# Patient Record
Sex: FEMALE | Race: WHITE | NOT HISPANIC OR LATINO | Employment: UNEMPLOYED | URBAN - METROPOLITAN AREA
[De-identification: names, ages, dates, MRNs, and addresses within clinical notes are randomized per-mention and may not be internally consistent; named-entity substitution may affect disease eponyms.]

---

## 2017-03-03 ENCOUNTER — OFFICE VISIT (OUTPATIENT)
Dept: PEDIATRICS | Facility: CLINIC | Age: 57
End: 2017-03-03
Payer: COMMERCIAL

## 2017-03-03 VITALS
TEMPERATURE: 99.4 F | SYSTOLIC BLOOD PRESSURE: 138 MMHG | HEIGHT: 64 IN | HEART RATE: 90 BPM | BODY MASS INDEX: 41.81 KG/M2 | OXYGEN SATURATION: 95 % | DIASTOLIC BLOOD PRESSURE: 80 MMHG | WEIGHT: 244.9 LBS

## 2017-03-03 DIAGNOSIS — E28.2 POLYCYSTIC OVARIAN SYNDROME: ICD-10-CM

## 2017-03-03 DIAGNOSIS — J01.90 ACUTE NON-RECURRENT SINUSITIS, UNSPECIFIED LOCATION: ICD-10-CM

## 2017-03-03 DIAGNOSIS — E66.01 MORBID OBESITY DUE TO EXCESS CALORIES (H): Primary | ICD-10-CM

## 2017-03-03 LAB — HBA1C MFR BLD: 5.5 % (ref 4.3–6)

## 2017-03-03 PROCEDURE — 36415 COLL VENOUS BLD VENIPUNCTURE: CPT | Performed by: INTERNAL MEDICINE

## 2017-03-03 PROCEDURE — 99214 OFFICE O/P EST MOD 30 MIN: CPT | Performed by: INTERNAL MEDICINE

## 2017-03-03 PROCEDURE — 83036 HEMOGLOBIN GLYCOSYLATED A1C: CPT | Performed by: INTERNAL MEDICINE

## 2017-03-03 RX ORDER — METFORMIN HCL 500 MG
1000 TABLET, EXTENDED RELEASE 24 HR ORAL 2 TIMES DAILY WITH MEALS
Qty: 360 TABLET | Refills: 3 | Status: SHIPPED | OUTPATIENT
Start: 2017-03-03 | End: 2018-03-04

## 2017-03-03 NOTE — PROGRESS NOTES
"  SUBJECTIVE:                                                    Vani Daniel is a 56 year old female who presents to clinic today for the following health issues:      RESPIRATORY SYMPTOMS      Duration: about 1 month     Description: started as a cold and then down to chest with hard coughing.  Was losing voice.  Now only on one side: nasal congestion, facial pain/pressure, cough from drainage, ear pain right and hoarse voice    Severity: moderate    Accompanying signs and symptoms: diarrhea, chills    History (predisposing factors):  none    Precipitating or alleviating factors: None    Therapies tried and outcome:  None currently, only cough syrup at night when cough was severe      Working as , getting 8-10k steps/d.  Taking metformin regularly.      Problem list and histories reviewed & adjusted, as indicated.  Additional history: as documented    Labs reviewed in EPIC    Reviewed and updated as needed this visit by clinical staff  Tobacco  Allergies  Meds  Problems  Med Hx  Surg Hx  Fam Hx  Soc Hx        Reviewed and updated as needed this visit by Provider  Allergies  Meds  Problems         ROS:  Constitutional, HEENT, cardiovascular, pulmonary, gi and gu systems are negative, except as otherwise noted.    OBJECTIVE:                                                    /80 (BP Location: Right arm, Patient Position: Chair, Cuff Size: Adult Large)  Pulse 90  Temp 99.4  F (37.4  C) (Tympanic)  Ht 5' 3.5\" (1.613 m)  Wt 244 lb 14.4 oz (111.1 kg)  LMP 01/25/2009  SpO2 95%  BMI 42.7 kg/m2  Body mass index is 42.7 kg/(m^2).  GENERAL: healthy, alert and no distress  EYES: Eyes grossly normal to inspection, PERRL and conjunctivae and sclerae normal  HENT: ear canals and TM's normal, nose and mouth without ulcers or lesions  NECK: no adenopathy, no asymmetry, masses, or scars and thyroid normal to palpation  RESP: lungs clear to auscultation - no rales, rhonchi or wheezes  CV: " regular rate and rhythm, normal S1 S2, no S3 or S4, no murmur, click or rub, no peripheral edema and peripheral pulses strong  MS: no gross musculoskeletal defects noted, no edema         ASSESSMENT/PLAN:                                                        1. Morbid obesity due to excess calories (H)  Reviewed the treatment options for obesity including diet and exercise regimens.  Emphasized that lifestyle change, and most particularly regular exercise.  Continue metformin  - Hemoglobin A1c    2. Polycystic ovarian syndrome  Stable, recheck labs  - Hemoglobin A1c  - metFORMIN (GLUCOPHAGE-XR) 500 MG 24 hr tablet; Take 2 tablets (1,000 mg) by mouth 2 times daily (with meals)  Dispense: 360 tablet; Refill: 3    3. Acute non-recurrent sinusitis, unspecified location  Symptomatic cares, antibiotic per orders and follow-up if not improving  - amoxicillin-clavulanate (AUGMENTIN) 875-125 MG per tablet; Take 1 tablet by mouth 2 times daily  Dispense: 20 tablet; Refill: 0    See Patient Instructions    Bianca Arias MD  [facility]    The 10-year ASCVD risk score (Chelsea SHERWIN Jr., et al., 2013) is: 3.3%    Values used to calculate the score:      Age: 56 years      Sex: Female      Is Non- : No      Diabetic: No      Tobacco smoker: No      Systolic Blood Pressure: 138 mmHg      Is Prescribed Antihypertensives: No      HDL Cholesterol: 49 mg/dL      Total Cholesterol: 194 mg/dL

## 2017-03-03 NOTE — PATIENT INSTRUCTIONS
Sinusitis (Antibiotic Treatment)    The sinuses are air-filled spaces within the bones of the face. They connect to the inside of the nose. Sinusitis is an inflammation of the tissue lining the sinus cavity. Sinus inflammation can occur during a cold. It can also be due to allergies to pollens and other particles in the air. Sinusitis can cause symptoms of sinus congestion and fullness. A sinus infection causes fever, headache and facial pain. There is often green or yellow drainage from the nose or into the back of the throat (post-nasal drip). You have been given antibiotics to treat this condition.  Home care:    Take the full course of antibiotics as instructed. Do not stop taking them, even if you feel better.  Take 1 pill twice daily with food.  Take a probiotic with 10-30 billion colony forming units until off the antibiotic.    Drink plenty of water, hot tea, and other liquids. This may help thin mucus. It also may promote sinus drainage.    Heat may help soothe painful areas of the face. Use a towel soaked in hot water. Or,  the shower and direct the hot spray onto your face. Using a vaporizer along with a menthol rub at night may also help.     An expectorant containing guaifenesin may help thin the mucus and promote drainage from the sinuses.    Over-the-counter decongestants may be used unless a similar medicine was prescribed. Nasal sprays work the fastest. Use one that contains phenylephrine or oxymetazoline. First blow the nose gently. Then use the spray. Do not use these medicines more often than directed on the label or symptoms may get worse. You may also use tablets containing pseudoephedrine. Avoid products that combine ingredients, because side effects may be increased. Read labels. You can also ask the pharmacist for help.     Use acetaminophen or ibuprofen to control pain, unless another pain medicine was prescribed.     Don't smoke. This can worsen symptoms.  Follow-up care  Follow  up with your healthcare provider or our staff if you are not improving within the next week.  When to seek medical advice  Call your healthcare provider if any of these occur:    Facial pain or headache becoming more severe    Stiff neck    Unusual drowsiness or confusion    Swelling of the forehead or eyelids    Vision problems, including blurred or double vision    Fever of 100.4 F (38 C) or higher, or as directed by your healthcare provider    Seizure    Breathing problems    Symptoms not resolving within 10 days    3686-6967 [employer]. [address] [place], [place] [address]. All rights reserved. This information is not intended as a substitute for professional medical care. Always follow your healthcare professional's instructions.

## 2017-03-03 NOTE — NURSING NOTE
"Chief Complaint   Patient presents with     URI       Initial /80 (BP Location: Right arm, Patient Position: Chair, Cuff Size: Adult Large)  Pulse 90  Temp 99.4  F (37.4  C) (Tympanic)  Ht 5' 3.5\" (1.613 m)  Wt 244 lb 14.4 oz (111.1 kg)  LMP 01/25/2009  SpO2 95%  BMI 42.7 kg/m2 Estimated body mass index is 42.7 kg/(m^2) as calculated from the following:    Height as of this encounter: 5' 3.5\" (1.613 m).    Weight as of this encounter: 244 lb 14.4 oz (111.1 kg).  Medication Reconciliation: complete  "

## 2017-03-03 NOTE — MR AVS SNAPSHOT
After Visit Summary   3/3/2017    Vani Daniel    MRN: [id number]           Patient Information     Date Of Birth          1960        Visit Information        Provider Department      3/3/2017 3:50 PM Bianca Arias MD [facility]        Today's Diagnoses     Morbid obesity due to excess calories (H)    -  1    Polycystic ovarian syndrome        Acute non-recurrent sinusitis, unspecified location          Care Instructions      Sinusitis (Antibiotic Treatment)    The sinuses are air-filled spaces within the bones of the face. They connect to the inside of the nose. Sinusitis is an inflammation of the tissue lining the sinus cavity. Sinus inflammation can occur during a cold. It can also be due to allergies to pollens and other particles in the air. Sinusitis can cause symptoms of sinus congestion and fullness. A sinus infection causes fever, headache and facial pain. There is often green or yellow drainage from the nose or into the back of the throat (post-nasal drip). You have been given antibiotics to treat this condition.  Home care:    Take the full course of antibiotics as instructed. Do not stop taking them, even if you feel better.  Take 1 pill twice daily with food.  Take a probiotic with 10-30 billion colony forming units until off the antibiotic.    Drink plenty of water, hot tea, and other liquids. This may help thin mucus. It also may promote sinus drainage.    Heat may help soothe painful areas of the face. Use a towel soaked in hot water. Or,  the shower and direct the hot spray onto your face. Using a vaporizer along with a menthol rub at night may also help.     An expectorant containing guaifenesin may help thin the mucus and promote drainage from the sinuses.    Over-the-counter decongestants may be used unless a similar medicine was prescribed. Nasal sprays work the fastest. Use one that contains phenylephrine or oxymetazoline. First blow the nose  gently. Then use the spray. Do not use these medicines more often than directed on the label or symptoms may get worse. You may also use tablets containing pseudoephedrine. Avoid products that combine ingredients, because side effects may be increased. Read labels. You can also ask the pharmacist for help.     Use acetaminophen or ibuprofen to control pain, unless another pain medicine was prescribed.     Don't smoke. This can worsen symptoms.  Follow-up care  Follow up with your healthcare provider or our staff if you are not improving within the next week.  When to seek medical advice  Call your healthcare provider if any of these occur:    Facial pain or headache becoming more severe    Stiff neck    Unusual drowsiness or confusion    Swelling of the forehead or eyelids    Vision problems, including blurred or double vision    Fever of 100.4 F (38 C) or higher, or as directed by your healthcare provider    Seizure    Breathing problems    Symptoms not resolving within 10 days    1329-4531 [employer]. [address] [place] [address]. All rights reserved. This information is not intended as a substitute for professional medical care. Always follow your healthcare professional's instructions.              Follow-ups after your visit        Who to contact     If you have questions or need follow up information about today's clinic visit or your schedule please contact [facility] directly at [phone].  Normal or non-critical lab and imaging results will be communicated to you by MyChart, letter or phone within 4 business days after the clinic has received the results. If you do not hear from us within 7 days, please contact the clinic through [employer] or phone. If you have a critical or abnormal lab result, we will notify you by phone as soon as possible.  Submit refill requests through [employer] or call your pharmacy and they will forward the refill request to us. Please allow 3  "business days for your refill to be completed.          Additional Information About Your Visit        The [employer] Information     [employer] gives you secure access to your electronic health record. If you see a primary care provider, you can also send messages to your care team and make appointments. If you have questions, please call your primary care clinic.  If you do not have a primary care provider, please call [phone] and they will assist you.        Care EveryWhere ID     This is your Care EveryWhere ID. This could be used by other organizations to access your [place] medical records  [id number]        Your Vitals Were     Pulse Temperature Height Last Period Pulse Oximetry BMI (Body Mass Index)    90 99.4  F (37.4  C) (Tympanic) 5' 3.5\" (1.613 m) 01/25/2009 95% 42.7 kg/m2       Blood Pressure from Last 3 Encounters:   03/03/17 138/80   02/23/16 136/84   02/13/15 136/90    Weight from Last 3 Encounters:   03/03/17 244 lb 14.4 oz (111.1 kg)   04/05/16 256 lb 3.2 oz (116.2 kg)   02/23/16 253 lb (114.8 kg)              We Performed the Following     Hemoglobin A1c          Today's Medication Changes          These changes are accurate as of: 3/3/17  4:29 PM.  If you have any questions, ask your nurse or doctor.               Start taking these medicines.        Dose/Directions    amoxicillin-clavulanate 875-125 MG per tablet   Commonly known as:  AUGMENTIN   Used for:  Acute non-recurrent sinusitis, unspecified location   Started by:  Bianca Arias MD        Dose:  1 tablet   Take 1 tablet by mouth 2 times daily   Quantity:  20 tablet   Refills:  0         These medicines have changed or have updated prescriptions.        Dose/Directions    metFORMIN 500 MG 24 hr tablet   Commonly known as:  GLUCOPHAGE-XR   This may have changed:  additional instructions   Used for:  Polycystic ovarian syndrome   Changed by:  Bianca Arias MD        Dose:  1000 mg   Take 2 tablets (1,000 mg) by mouth 2 times daily (with " meals)   Quantity:  360 tablet   Refills:  3            Where to get your medicines      These medications were sent to [facility]/pharmacy #[id number] - [place], [place] - [address] AT [address] [place] [address], [place] [address]     Phone:  [phone]     amoxicillin-clavulanate 875-125 MG per tablet    metFORMIN 500 MG 24 hr tablet                Primary Care Provider Office Phone # Fax #    Bianca Arias [phone]       [address] [place] [address]        Thank you!     Thank you for choosing [facility]  for your care. Our goal is always to provide you with excellent care. Hearing back from our patients is one way we can continue to improve our services. Please take a few minutes to complete the written survey that you may receive in the mail after your visit with us. Thank you!             Your Updated Medication List - Protect others around you: Learn how to safely use, store and throw away your medicines at www.disposemymeds.org.          This list is accurate as of: 3/3/17  4:29 PM.  Always use your most recent med list.                   Brand Name Dispense Instructions for use    amoxicillin-clavulanate 875-125 MG per tablet    AUGMENTIN    20 tablet    Take 1 tablet by mouth 2 times daily       fish oil-omega-3 fatty acids 1000 MG capsule     180 Cap    take 2 Caps by mouth daily.       metFORMIN 500 MG 24 hr tablet    GLUCOPHAGE-XR    360 tablet    Take 2 tablets (1,000 mg) by mouth 2 times daily (with meals)       VITAMIN D-3 PO      Take  by mouth.

## 2017-11-03 ENCOUNTER — ALLIED HEALTH/NURSE VISIT (OUTPATIENT)
Dept: NURSING | Facility: CLINIC | Age: 57
End: 2017-11-03
Payer: COMMERCIAL

## 2017-11-03 DIAGNOSIS — Z23 NEED FOR PROPHYLACTIC VACCINATION AND INOCULATION AGAINST INFLUENZA: Primary | ICD-10-CM

## 2017-11-03 PROCEDURE — 90471 IMMUNIZATION ADMIN: CPT

## 2017-11-03 PROCEDURE — 99207 ZZC NO CHARGE NURSE ONLY: CPT

## 2017-11-03 PROCEDURE — 90686 IIV4 VACC NO PRSV 0.5 ML IM: CPT

## 2017-11-03 NOTE — MR AVS SNAPSHOT
After Visit Summary   11/3/2017    Vani Daniel    MRN: [id number]           Patient Information     Date Of Birth          1960        Visit Information        Provider Department      11/3/2017 11:30 AM SHANTE NURSE AB [facility] [place]        Today's Diagnoses     Need for prophylactic vaccination and inoculation against influenza    -  1       Follow-ups after your visit        Who to contact     If you have questions or need follow up information about today's clinic visit or your schedule please contact [facility] directly at [phone].  Normal or non-critical lab and imaging results will be communicated to you by [employer], letter or phone within 4 business days after the clinic has received the results. If you do not hear from us within 7 days, please contact the clinic through [employer] or phone. If you have a critical or abnormal lab result, we will notify you by phone as soon as possible.  Submit refill requests through [employer] or call your pharmacy and they will forward the refill request to us. Please allow 3 business days for your refill to be completed.          Additional Information About Your Visit        MyChart Information     [employer] gives you secure access to your electronic health record. If you see a primary care provider, you can also send messages to your care team and make appointments. If you have questions, please call your primary care clinic.  If you do not have a primary care provider, please call [phone] and they will assist you.        Care EveryWhere ID     This is your Care EveryWhere ID. This could be used by other organizations to access your [place] medical records  [id number]        Your Vitals Were     Last Period                   01/25/2009            Blood Pressure from Last 3 Encounters:   03/03/17 138/80   02/23/16 136/84   02/13/15 136/90    Weight from Last 3 Encounters:   03/03/17 244 lb 14.4 oz (111.1 kg)   04/05/16  256 lb 3.2 oz (116.2 kg)   02/23/16 253 lb (114.8 kg)              We Performed the Following     FLU VAC, SPLIT VIRUS IM > 3 YO (QUADRIVALENT) [75403]     Vaccine Administration, Initial [98473]        Primary Care Provider Office Phone # Fax #    Bianca Arias [phone] [address] [place] [address]        Equal Access to Services     [facility]: Hadii aad ku hadasho Soomaali, waaxda luqadaha, qaybta kaalmada adeegyada, waxay idiin hayaan adeeg apkathyashlee stafford . So [facility] [phone].    ATENCIÓN: Si rosamaria farmer, tiene a jones disposición servicios gratuitos de asistencia lingüística. Birdame al [phone].    We comply with applicable federal civil rights laws and [place] laws. We do not discriminate on the basis of race, color, national origin, age, disability, sex, sexual orientation, or gender identity.            Thank you!     Thank you for choosing [facility]  for your care. Our goal is always to provide you with excellent care. Hearing back from our patients is one way we can continue to improve our services. Please take a few minutes to complete the written survey that you may receive in the mail after your visit with us. Thank you!             Your Updated Medication List - Protect others around you: Learn how to safely use, store and throw away your medicines at www.disposemymeds.org.          This list is accurate as of: 11/3/17  1:23 PM.  Always use your most recent med list.                   Brand Name Dispense Instructions for use Diagnosis    amoxicillin-clavulanate 875-125 MG per tablet    AUGMENTIN    20 tablet    Take 1 tablet by mouth 2 times daily    Acute non-recurrent sinusitis, unspecified location       fish oil-omega-3 fatty acids 1000 MG capsule     180 Cap    take 2 Caps by mouth daily.        metFORMIN 500 MG 24 hr tablet    GLUCOPHAGE-XR    360 tablet    Take 2 tablets (1,000 mg) by mouth 2 times daily (with meals)    Polycystic  ovarian syndrome       VITAMIN D-3 PO      Take  by mouth.

## 2017-11-03 NOTE — PROGRESS NOTES
Injectable Influenza Immunization Documentation    1.  Is the person to be vaccinated sick today?   No    2. Does the person to be vaccinated have an allergy to a component   of the vaccine?   No  Egg Allergy Algorithm Link    3. Has the person to be vaccinated ever had a serious reaction   to influenza vaccine in the past?   No    4. Has the person to be vaccinated ever had Guillain-Barré syndrome?   No    Form completed by Rita Harris MA// November 3, 2017 1:22 PM

## 2018-04-25 ENCOUNTER — TRANSFERRED RECORDS (OUTPATIENT)
Dept: HEALTH INFORMATION MANAGEMENT | Facility: CLINIC | Age: 58
End: 2018-04-25

## 2018-05-23 ENCOUNTER — TRANSFERRED RECORDS (OUTPATIENT)
Dept: MULTI SPECIALTY CLINIC | Facility: CLINIC | Age: 58
End: 2018-05-23

## 2018-05-23 LAB — PAP SMEAR - HIM PATIENT REPORTED: NEGATIVE

## 2018-07-31 LAB
ALT SERPL-CCNC: 31 U/L
AST SERPL-CCNC: 22 U/L (ref 17–42)
CHOLEST SERPL-MCNC: 221 MG/DL (ref 100–200)
CREAT SERPL-MCNC: 0.9 MG/DL (ref 0.5–1.5)
GFR SERPL CREATININE-BSD FRML MDRD: 68.1 ML/MIN (ref 60–200)
GLUCOSE SERPL-MCNC: 100 MG/DL (ref 70–99)
HDLC SERPL-MCNC: 53 MG/DL (ref 40–150)
LDLC SERPL CALC-MCNC: 124 MG/DL (ref 0–100)
POTASSIUM SERPL-SCNC: 4.5 MEQ/L (ref 3.5–5.3)
TRIGL SERPL-MCNC: 219 MG/DL (ref 0–150)
TSH SERPL-ACNC: 1.97 MIU/ML (ref 0.27–4.2)

## 2019-02-01 ENCOUNTER — TRANSFERRED RECORDS (OUTPATIENT)
Dept: HEALTH INFORMATION MANAGEMENT | Facility: CLINIC | Age: 59
End: 2019-02-01

## 2019-02-01 LAB
ALT SERPL-CCNC: 31 U/L (ref 0–65)
AST SERPL-CCNC: 22 U/L (ref 17–42)
CHOLEST SERPL-MCNC: 185 MG/DL (ref 100–200)
CREAT SERPL-MCNC: 0.8 MG/DL (ref 0.5–1.5)
GFR SERPL CREATININE-BSD FRML MDRD: 74.2 ML/MIN
GLUCOSE SERPL-MCNC: 112 MG/DL (ref 70–99)
HDLC SERPL-MCNC: 56 MG/DL (ref 40–150)
POTASSIUM SERPL-SCNC: 5 MEQ/L (ref 3.5–5.3)
TRIGL SERPL-MCNC: 117 MG/DL (ref 0–150)
TSH SERPL-ACNC: 1.97 MIU/ML (ref 0.27–4.2)

## 2019-02-25 ENCOUNTER — TRANSFERRED RECORDS (OUTPATIENT)
Dept: HEALTH INFORMATION MANAGEMENT | Facility: CLINIC | Age: 59
End: 2019-02-25

## 2019-05-16 ENCOUNTER — HEALTH MAINTENANCE LETTER (OUTPATIENT)
Age: 59
End: 2019-05-16

## 2019-05-20 ENCOUNTER — TRANSFERRED RECORDS (OUTPATIENT)
Dept: HEALTH INFORMATION MANAGEMENT | Facility: CLINIC | Age: 59
End: 2019-05-20

## 2019-10-01 ENCOUNTER — HEALTH MAINTENANCE LETTER (OUTPATIENT)
Age: 59
End: 2019-10-01

## 2019-12-15 ENCOUNTER — HEALTH MAINTENANCE LETTER (OUTPATIENT)
Age: 59
End: 2019-12-15

## 2020-04-27 ENCOUNTER — MYC MEDICAL ADVICE (OUTPATIENT)
Dept: PEDIATRICS | Facility: CLINIC | Age: 60
End: 2020-04-27

## 2020-04-27 DIAGNOSIS — E28.2 POLYCYSTIC OVARIAN SYNDROME: ICD-10-CM

## 2020-04-27 NOTE — TELEPHONE ENCOUNTER
Pending Prescriptions:                       Disp   Refills    metFORMIN (GLUCOPHAGE-XR) 500 MG 24 hr ta*360 ta*0            Sig: Take 2 tablets (1,000 mg) by mouth 2 times daily           (with meals) Due for an office visit and labs.           Please call to schedule.    Pt has annual physical set for 8/21/20.  Pt will need new rx sent to [facility].  Pt ph # [phone].  Thank you.  aundrea

## 2020-04-28 NOTE — TELEPHONE ENCOUNTER
MA/TC: Please call patient to schedule visit sooner if patient does not have enough medication to get to appointment in August.  Thanks!  Marcelina STERN RN, BSN

## 2020-05-06 ENCOUNTER — VIRTUAL VISIT (OUTPATIENT)
Dept: PEDIATRICS | Facility: CLINIC | Age: 60
End: 2020-05-06
Payer: COMMERCIAL

## 2020-05-06 DIAGNOSIS — I10 HYPERTENSION GOAL BP (BLOOD PRESSURE) < 140/90: ICD-10-CM

## 2020-05-06 DIAGNOSIS — E28.2 POLYCYSTIC OVARIAN SYNDROME: Primary | ICD-10-CM

## 2020-05-06 DIAGNOSIS — E66.01 MORBID OBESITY DUE TO EXCESS CALORIES (H): ICD-10-CM

## 2020-05-06 DIAGNOSIS — Z20.828 CONTACT WITH AND (SUSPECTED) EXPOSURE TO OTHER VIRAL COMMUNICABLE DISEASES: ICD-10-CM

## 2020-05-06 PROCEDURE — 99203 OFFICE O/P NEW LOW 30 MIN: CPT | Mod: 95 | Performed by: NURSE PRACTITIONER

## 2020-05-06 RX ORDER — UBIDECARENONE 75 MG
100 CAPSULE ORAL DAILY
COMMUNITY
End: 2022-05-11

## 2020-05-06 RX ORDER — METFORMIN HCL 500 MG
1000 TABLET, EXTENDED RELEASE 24 HR ORAL 2 TIMES DAILY WITH MEALS
Qty: 360 TABLET | Refills: 0 | Status: CANCELLED | OUTPATIENT
Start: 2020-05-06

## 2020-05-06 RX ORDER — ASCORBIC ACID 100 MG
TABLET,CHEWABLE ORAL
COMMUNITY
End: 2022-05-11

## 2020-05-06 RX ORDER — METFORMIN HCL 500 MG
1000 TABLET, EXTENDED RELEASE 24 HR ORAL 2 TIMES DAILY WITH MEALS
Qty: 360 TABLET | Refills: 0 | COMMUNITY
Start: 2020-05-06

## 2020-05-06 NOTE — PROGRESS NOTES
"Vani Daniel is a 59 year old female who is being evaluated via a billable telephone visit.      The patient has been notified of following:     \"This telephone visit will be conducted via a call between you and your physician/provider. We have found that certain health care needs can be provided without the need for a physical exam.  This service lets us provide the care you need with a short phone conversation.  If a prescription is necessary we can send it directly to your pharmacy.  If lab work is needed we can place an order for that and you can then stop by our lab to have the test done at a later time.    Telephone visits are billed at different rates depending on your insurance coverage. During this emergency period, for some insurers they may be billed the same as an in-person visit.  Please reach out to your insurance provider with any questions.    If during the course of the call the physician/provider feels a telephone visit is not appropriate, you will not be charged for this service.\"    Patient has given verbal consent for Telephone visit?  Yes    What phone number would you like to be contacted at? [phone]    How would you like to obtain your AVS? Cassandra Croft     Vani Daniel is a 59 year old female who presents to clinic today for the following health issues:    HPI  Medication Followup of Metformin     Taking Medication as prescribed: yes    Side Effects:  None    Medication Helping Symptoms:  yes     Last OV with PCP was 3/3/17.  Has upcoming appt with former PCP on 8/21/20 for annual physical.  Moved to [place] for 's work for a couple years, moved back to [place] Nov 2019.   Notes she had normal pap + mammo/breast MRI (sister with hx of breast Ca) done April 2018, saw endocrine in [place] for PCOS Oct 2019 with labs with no changes to metformin dose.  No change to medications.  She is noticing weight gain with current COVID-19 pandemic quarantine lifestyle. Has " gained 5 lbs.  Working on adding daily walks and Silver Sneakers exercises.  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  BPs have been good, last checked 121/83.      Wants COVID-19 antibody testing.  Mom recently passed away, was residing in LTC in [place].  Known COVID-19 cases at the care facility.   Pt became ill shortly after this with intermittent fever, chills, muscle aches x3 weeks onset around 3/18 but feels well now.    Reviewed and updated as needed this visit by Provider  Meds  Problems         Review of Systems   ROS COMP: Constitutional, HEENT, cardiovascular, pulmonary, GI, , musculoskeletal, neuro, skin, endocrine and psych systems are negative, except as otherwise noted.   no distress    Objective   Reported vitals:  LMP 01/25/2009      PSYCH: Alert and oriented times 3; coherent speech, normal   rate and volume, able to articulate logical thoughts, able   to abstract reason, no tangential thoughts, no hallucinations   or delusions  Her affect is normal and pleasant  RESP: No cough, no audible wheezing, able to talk in full sentences  Remainder of exam unable to be completed due to telephone visits    Diagnostic Test Results:  Labs reviewed in Epic        Assessment/Plan:  1. Polycystic ovarian syndrome  Stable, no concerns with Metformin. Reports she saw endocrine while living in [place] in Oct 2019. She plans to send these records, but ok to hold off on labs until visit with PCP in August 2020 (would want her to come in for labs in 3 mos before any addtl refills if unable to do in-person visit at that time).   Was taking Metformin XR twice daily (unsure why switched from immediate release to XR) but then switched to immediate release from endocrine in [place]. She is fine with twice daily dosing, but could discuss once daily XR dosing with PCP if she wishes to change this.  - metFORMIN (GLUCOPHAGE) 500 MG tablet; Take 2 tablets (1,000 mg) by mouth 2 times daily (with meals)  Dispense: 360 tablet;  Refill: 0    2. Morbid obesity due to excess calories (H)  Admits to recent weight gain. Plans to focus on exercise. Reinforced healthy lifestyle with diet and exercise.    3. Hypertension goal BP (blood pressure) < 140/90  Home readings well controlled, asymptomatic.     4. Contact with and (suspected) exposure to other viral communicable diseases  Asymptomatic. Testing ordered.  - COVID-19 Virus (Coronavirus) Antibody; Future    Other health maintenance:  -Will work on sending Mammo and Pap records to our clinic.  -Needs colonoscopy, she is fine holding off to order this until visit in August due to current COVID-19 pandemic.  -Labs in August (scheduled 8/21/20)    Return in about 3 months (around 8/6/2020) for Physical Exam.    Phone call duration:  45 minutes    MICHELLE Gutierrez, CNP

## 2020-05-07 DIAGNOSIS — Z20.828 CONTACT WITH AND (SUSPECTED) EXPOSURE TO OTHER VIRAL COMMUNICABLE DISEASES: ICD-10-CM

## 2020-05-07 PROCEDURE — 36415 COLL VENOUS BLD VENIPUNCTURE: CPT | Performed by: NURSE PRACTITIONER

## 2020-05-07 PROCEDURE — 86769 SARS-COV-2 COVID-19 ANTIBODY: CPT | Mod: 90 | Performed by: NURSE PRACTITIONER

## 2020-05-07 PROCEDURE — 99000 SPECIMEN HANDLING OFFICE-LAB: CPT | Performed by: NURSE PRACTITIONER

## 2020-05-08 LAB
COVID-19 SPIKE RBD ABY TITER: NORMAL
COVID-19 SPIKE RBD ABY: NEGATIVE

## 2020-08-13 ENCOUNTER — DOCUMENTATION ONLY (OUTPATIENT)
Dept: LAB | Facility: CLINIC | Age: 60
End: 2020-08-13

## 2020-08-13 DIAGNOSIS — E04.1 THYROID NODULE: ICD-10-CM

## 2020-08-13 DIAGNOSIS — Z13.6 CARDIOVASCULAR SCREENING; LDL GOAL LESS THAN 160: ICD-10-CM

## 2020-08-13 DIAGNOSIS — I10 HYPERTENSION GOAL BP (BLOOD PRESSURE) < 140/90: ICD-10-CM

## 2020-08-13 DIAGNOSIS — E28.2 POLYCYSTIC OVARIAN SYNDROME: Primary | ICD-10-CM

## 2020-08-18 ENCOUNTER — ALLIED HEALTH/NURSE VISIT (OUTPATIENT)
Dept: NURSING | Facility: CLINIC | Age: 60
End: 2020-08-18
Payer: COMMERCIAL

## 2020-08-18 VITALS — HEART RATE: 76 BPM | DIASTOLIC BLOOD PRESSURE: 78 MMHG | SYSTOLIC BLOOD PRESSURE: 138 MMHG

## 2020-08-18 DIAGNOSIS — E04.1 THYROID NODULE: ICD-10-CM

## 2020-08-18 DIAGNOSIS — Z13.6 CARDIOVASCULAR SCREENING; LDL GOAL LESS THAN 160: ICD-10-CM

## 2020-08-18 DIAGNOSIS — I10 HYPERTENSION GOAL BP (BLOOD PRESSURE) < 140/90: ICD-10-CM

## 2020-08-18 DIAGNOSIS — I10 HYPERTENSION GOAL BP (BLOOD PRESSURE) < 140/90: Primary | ICD-10-CM

## 2020-08-18 DIAGNOSIS — E28.2 POLYCYSTIC OVARIAN SYNDROME: ICD-10-CM

## 2020-08-18 LAB
ANION GAP SERPL CALCULATED.3IONS-SCNC: 9 MMOL/L (ref 3–14)
BUN SERPL-MCNC: 20 MG/DL (ref 7–30)
CALCIUM SERPL-MCNC: 9.2 MG/DL (ref 8.5–10.1)
CHLORIDE SERPL-SCNC: 105 MMOL/L (ref 94–109)
CHOLEST SERPL-MCNC: 218 MG/DL
CO2 SERPL-SCNC: 24 MMOL/L (ref 20–32)
CREAT SERPL-MCNC: 0.84 MG/DL (ref 0.52–1.04)
GFR SERPL CREATININE-BSD FRML MDRD: 75 ML/MIN/{1.73_M2}
GLUCOSE SERPL-MCNC: 110 MG/DL (ref 70–99)
HDLC SERPL-MCNC: 51 MG/DL
LDLC SERPL CALC-MCNC: 137 MG/DL
NONHDLC SERPL-MCNC: 167 MG/DL
POTASSIUM SERPL-SCNC: 3.9 MMOL/L (ref 3.4–5.3)
SODIUM SERPL-SCNC: 137 MMOL/L (ref 133–144)
TRIGL SERPL-MCNC: 151 MG/DL
TSH SERPL DL<=0.005 MIU/L-ACNC: 2.12 MU/L (ref 0.4–4)

## 2020-08-18 PROCEDURE — 36415 COLL VENOUS BLD VENIPUNCTURE: CPT | Performed by: INTERNAL MEDICINE

## 2020-08-18 PROCEDURE — 99207 ZZC NO CHARGE NURSE ONLY: CPT

## 2020-08-18 PROCEDURE — 80048 BASIC METABOLIC PNL TOTAL CA: CPT | Performed by: INTERNAL MEDICINE

## 2020-08-18 PROCEDURE — 80061 LIPID PANEL: CPT | Performed by: INTERNAL MEDICINE

## 2020-08-18 PROCEDURE — 84443 ASSAY THYROID STIM HORMONE: CPT | Performed by: INTERNAL MEDICINE

## 2020-08-18 NOTE — PROGRESS NOTES
Vani Daniel is a 59 year old patient who comes in today for a Blood Pressure check.  Initial BP:  /78 (BP Location: Right arm, Patient Position: Sitting, Cuff Size: Adult Large)   Pulse 76   LMP 01/25/2009      Data Unavailable  Disposition: results routed to provider

## 2020-08-21 ENCOUNTER — VIRTUAL VISIT (OUTPATIENT)
Dept: PEDIATRICS | Facility: CLINIC | Age: 60
End: 2020-08-21
Payer: COMMERCIAL

## 2020-08-21 DIAGNOSIS — Z12.31 VISIT FOR SCREENING MAMMOGRAM: ICD-10-CM

## 2020-08-21 DIAGNOSIS — Z13.220 SCREENING FOR LIPID DISORDERS: ICD-10-CM

## 2020-08-21 DIAGNOSIS — E66.01 MORBID OBESITY DUE TO EXCESS CALORIES (H): ICD-10-CM

## 2020-08-21 DIAGNOSIS — R73.01 IMPAIRED FASTING GLUCOSE: Primary | ICD-10-CM

## 2020-08-21 DIAGNOSIS — E28.2 POLYCYSTIC OVARIAN SYNDROME: ICD-10-CM

## 2020-08-21 DIAGNOSIS — Z84.89 FAMILY HISTORY OF GENETIC DISORDER: ICD-10-CM

## 2020-08-21 DIAGNOSIS — Z12.11 COLON CANCER SCREENING: ICD-10-CM

## 2020-08-21 DIAGNOSIS — I10 HYPERTENSION GOAL BP (BLOOD PRESSURE) < 140/90: ICD-10-CM

## 2020-08-21 PROCEDURE — 99214 OFFICE O/P EST MOD 30 MIN: CPT | Mod: 95 | Performed by: INTERNAL MEDICINE

## 2020-08-21 NOTE — PROGRESS NOTES
"Vani Daniel is a 59 year old female who is being evaluated via a billable video visit.      The patient has been notified of following:     \"This video visit will be conducted via a call between you and your physician/provider. We have found that certain health care needs can be provided without the need for an in-person physical exam.  This service lets us provide the care you need with a video conversation.  If a prescription is necessary we can send it directly to your pharmacy.  If lab work is needed we can place an order for that and you can then stop by our lab to have the test done at a later time.    Video visits are billed at different rates depending on your insurance coverage.  Please reach out to your insurance provider with any questions.    If during the course of the call the physician/provider feels a video visit is not appropriate, you will not be charged for this service.\"    Patient has given verbal consent for Video visit? Yes  How would you like to obtain your AVS? MyChart  If you are dropped from the video visit, the video invite should be resent to: Text to cell phone: -- [phone]  Will anyone else be joining your video visit? No      Subjective     Vani Daniel is a 59 year old female who presents today via video visit for the following health issues:    History of Present Illness        She eats 2-3 servings of fruits and vegetables daily.She consumes 0 sweetened beverage(s) daily.She exercises with enough effort to increase her heart rate 9 or less minutes per day.  She exercises with enough effort to increase her heart rate 3 or less days per week.   She is taking medications regularly.      Medication Followup of Metformin    Taking Medication as prescribed: pt discontinued metformin in the last month due to swelling     Side Effects:  Yes x 2 months some swelling in ankles     Medication Helping Symptoms:  yes      Prediabetes - was on metformin.  Ankle swelling, " every night.    Thought it might be due to metformin so stopped taking it and now it is better.   Is trying to do keto diet and has lost 12lbs.  Is doing it with spouse.  20gm carbs/d or less.  Avoids salt and doesn't eat processed food.  Doesn't use artificial sweetener.  Intermittent fasting.  Checks BP at home and 109-120 SBP at home.  Finds skin and intestine feel better now off gluten.    Had illness with fever, cough, pink eye and body aches in March after being in [place] in March visiting her mom in [place].  COVID ab was negative.  Daughter is pregnant again.    The 10-year ASCVD risk score ([place] COURTNEY JrChelsi, et al., 2013) is: 4.7%    Values used to calculate the score:      Age: 59 years      Sex: Female      Is Non- : No      Diabetic: No      Tobacco smoker: No      Systolic Blood Pressure: 138 mmHg      Is BP treated: No      HDL Cholesterol: 51 mg/dL      Total Cholesterol: 218 mg/dL     Paps - used to do with Dr. Bloch.  Last pap in [place] about 1-2 yrs ago - will bring in records. Did mammogram and MRI for breast cancer screening spring 2019.  Were normal.       Moved back to [place] from [place] in July to support daughter and family.       Video Start Time: 2:26 PM      Review of Systems         Objective           Vitals:  No vitals were obtained today due to virtual visit.    Physical Exam     GENERAL: Healthy, alert and no distress  EYES: Eyes grossly normal to inspection.  No discharge or erythema, or obvious scleral/conjunctival abnormalities.  RESP: No audible wheeze, cough, or visible cyanosis.  No visible retractions or increased work of breathing.    SKIN: Visible skin clear. No significant rash, abnormal pigmentation or lesions.  NEURO: Cranial nerves grossly intact.  Mentation and speech appropriate for age.  PSYCH: Mentation appears normal, affect normal/bright, judgement and insight intact, normal speech and appearance well-groomed.              Assessment & Plan     Impaired fasting  glucose  Off metformin x1 month and labs improved with lifestyle change.  Will stay off medication for now and recheck in 3 months.   - **Glucose FUTURE anytime; Future  - Lipid panel reflex to direct LDL Fasting; Future    Polycystic ovarian syndrome  Recheck labs in 3 months, continue diet    Morbid obesity due to excess calories (H)  Discussed exercise, continue keto diet    Hypertension goal BP (blood pressure) < 140/90  Controlled off medication, continue to monitor      Family history of genetic disorder  Discussed and recommend oncology genetics eval  - Oncology/Hematology Adult Referral; Future    Screening for lipid disorders    - Lipid panel reflex to direct LDL Fasting; Future    Colon cancer screening  Defer colonoscopy this year and do FIT test.  - Fecal colorectal cancer screen (FIT); Future    Visit for screening mammogram  Given family history, Mammogram yearly  - MA Screen Bilateral w/Miguel A; Future       See Patient Instructions    No follow-ups on file.    Bianca Arias MD  [facility]      Video-Visit Details    Type of service:  Video Visit    Video End Time:3:05 PM    Originating Location (pt. Location): Home    Distant Location (provider location):  [facility]     Platform used for Video Visit: Virgilio

## 2020-08-21 NOTE — PATIENT INSTRUCTIONS
You should get the tetanus (tdap) and flu shot in mid-September.  You need a nurse-only appointment for this, they will call you to schedule.  They can recheck BP then too.      Stay off the metformin and continue with the keto diet.    Do repeat labs in 3 months to see how your sugar and cholesterol do with the keto diet.      Drop off your records and can help you determine when you need your pap - likely not until 2022.    They will call you to schedule the mammogram and cancer .

## 2020-09-15 ENCOUNTER — HOSPITAL ENCOUNTER (OUTPATIENT)
Dept: MAMMOGRAPHY | Facility: CLINIC | Age: 60
Discharge: HOME | End: 2020-09-15
Attending: INTERNAL MEDICINE | Admitting: INTERNAL MEDICINE
Payer: COMMERCIAL

## 2020-09-15 DIAGNOSIS — Z12.31 VISIT FOR SCREENING MAMMOGRAM: ICD-10-CM

## 2020-09-15 PROCEDURE — 77067 SCR MAMMO BI INCL CAD: CPT

## 2020-09-16 ENCOUNTER — ALLIED HEALTH/NURSE VISIT (OUTPATIENT)
Dept: NURSING | Facility: CLINIC | Age: 60
End: 2020-09-16
Payer: COMMERCIAL

## 2020-09-16 VITALS — HEART RATE: 72 BPM | DIASTOLIC BLOOD PRESSURE: 70 MMHG | SYSTOLIC BLOOD PRESSURE: 136 MMHG

## 2020-09-16 DIAGNOSIS — Z23 NEED FOR PROPHYLACTIC VACCINATION AND INOCULATION AGAINST INFLUENZA: Primary | ICD-10-CM

## 2020-09-16 PROCEDURE — 90471 IMMUNIZATION ADMIN: CPT

## 2020-09-16 PROCEDURE — 90472 IMMUNIZATION ADMIN EACH ADD: CPT

## 2020-09-16 PROCEDURE — 90682 RIV4 VACC RECOMBINANT DNA IM: CPT

## 2020-09-16 PROCEDURE — 90715 TDAP VACCINE 7 YRS/> IM: CPT

## 2020-09-16 NOTE — PROGRESS NOTES
Vani Daniel is a 59 year old patient who comes in today for a Blood Pressure check.  Initial BP:  /70 (Cuff Size: Adult Large)   Pulse 72   LMP 01/25/2009      72  Disposition: follow-up as previously indicated by provider  [place], [facility]

## 2020-09-24 ENCOUNTER — MYC MEDICAL ADVICE (OUTPATIENT)
Dept: PEDIATRICS | Facility: CLINIC | Age: 60
End: 2020-09-24

## 2020-09-25 NOTE — TELEPHONE ENCOUNTER
Pap smear date sent to quality team to update in , FIT test mailed to patient for completion, defer for 2wks to give patient time to complete.

## 2020-09-25 NOTE — TELEPHONE ENCOUNTER
Dr. Arias,    Please see pt's [facility] message regarding stool test.    Thank you  Britt Corey RN on 9/25/2020 at 8:10 AM

## 2020-10-02 DIAGNOSIS — Z12.11 COLON CANCER SCREENING: ICD-10-CM

## 2020-10-02 PROCEDURE — 82274 ASSAY TEST FOR BLOOD FECAL: CPT | Performed by: INTERNAL MEDICINE

## 2020-10-06 LAB — HEMOCCULT STL QL IA: NEGATIVE

## 2021-01-15 ENCOUNTER — HEALTH MAINTENANCE LETTER (OUTPATIENT)
Age: 61
End: 2021-01-15

## 2021-03-18 ASSESSMENT — ENCOUNTER SYMPTOMS
BREAST MASS: 0
ABDOMINAL PAIN: 0
DYSURIA: 0
FREQUENCY: 0
DIARRHEA: 0
SORE THROAT: 0
CHILLS: 0
JOINT SWELLING: 1
NAUSEA: 0
FEVER: 0
HEARTBURN: 0
CONSTIPATION: 0
PALPITATIONS: 0
PARESTHESIAS: 0
ARTHRALGIAS: 1
DIZZINESS: 0
EYE PAIN: 0
HEMATOCHEZIA: 0
COUGH: 0
NERVOUS/ANXIOUS: 0
HEMATURIA: 0
WEAKNESS: 0
SHORTNESS OF BREATH: 0
MYALGIAS: 0

## 2021-03-19 ENCOUNTER — OFFICE VISIT (OUTPATIENT)
Dept: PEDIATRICS | Facility: CLINIC | Age: 61
End: 2021-03-19
Payer: COMMERCIAL

## 2021-03-19 VITALS
OXYGEN SATURATION: 99 % | HEART RATE: 88 BPM | BODY MASS INDEX: 45.48 KG/M2 | RESPIRATION RATE: 18 BRPM | TEMPERATURE: 97.4 F | SYSTOLIC BLOOD PRESSURE: 126 MMHG | HEIGHT: 65 IN | WEIGHT: 273 LBS | DIASTOLIC BLOOD PRESSURE: 78 MMHG

## 2021-03-19 DIAGNOSIS — Z00.00 ROUTINE GENERAL MEDICAL EXAMINATION AT A HEALTH CARE FACILITY: Primary | ICD-10-CM

## 2021-03-19 DIAGNOSIS — M25.562 CHRONIC PAIN OF LEFT KNEE: ICD-10-CM

## 2021-03-19 DIAGNOSIS — E66.01 MORBID OBESITY DUE TO EXCESS CALORIES (H): ICD-10-CM

## 2021-03-19 DIAGNOSIS — G89.29 CHRONIC PAIN OF LEFT KNEE: ICD-10-CM

## 2021-03-19 DIAGNOSIS — E04.1 THYROID NODULE: ICD-10-CM

## 2021-03-19 DIAGNOSIS — Z11.4 SCREENING FOR HIV (HUMAN IMMUNODEFICIENCY VIRUS): ICD-10-CM

## 2021-03-19 DIAGNOSIS — L40.9 PSORIASIS: ICD-10-CM

## 2021-03-19 DIAGNOSIS — E28.2 POLYCYSTIC OVARIAN SYNDROME: ICD-10-CM

## 2021-03-19 DIAGNOSIS — Z13.220 SCREENING FOR LIPID DISORDERS: ICD-10-CM

## 2021-03-19 DIAGNOSIS — R73.01 IMPAIRED FASTING GLUCOSE: ICD-10-CM

## 2021-03-19 DIAGNOSIS — Z12.4 SCREENING FOR MALIGNANT NEOPLASM OF CERVIX: ICD-10-CM

## 2021-03-19 LAB
CHOLEST SERPL-MCNC: 213 MG/DL
GLUCOSE SERPL-MCNC: 114 MG/DL (ref 70–99)
HDLC SERPL-MCNC: 52 MG/DL
LDLC SERPL CALC-MCNC: 116 MG/DL
NONHDLC SERPL-MCNC: 161 MG/DL
TRIGL SERPL-MCNC: 225 MG/DL

## 2021-03-19 PROCEDURE — 82947 ASSAY GLUCOSE BLOOD QUANT: CPT | Performed by: INTERNAL MEDICINE

## 2021-03-19 PROCEDURE — 80061 LIPID PANEL: CPT | Performed by: INTERNAL MEDICINE

## 2021-03-19 PROCEDURE — 36415 COLL VENOUS BLD VENIPUNCTURE: CPT | Performed by: INTERNAL MEDICINE

## 2021-03-19 PROCEDURE — 87624 HPV HI-RISK TYP POOLED RSLT: CPT | Performed by: INTERNAL MEDICINE

## 2021-03-19 PROCEDURE — 99396 PREV VISIT EST AGE 40-64: CPT | Performed by: INTERNAL MEDICINE

## 2021-03-19 PROCEDURE — G0145 SCR C/V CYTO,THINLAYER,RESCR: HCPCS | Performed by: INTERNAL MEDICINE

## 2021-03-19 SDOH — ECONOMIC STABILITY: TRANSPORTATION INSECURITY
IN THE PAST 12 MONTHS, HAS LACK OF TRANSPORTATION KEPT YOU FROM MEETINGS, WORK, OR FROM GETTING THINGS NEEDED FOR DAILY LIVING?: NOT ASKED

## 2021-03-19 SDOH — ECONOMIC STABILITY: FOOD INSECURITY: WITHIN THE PAST 12 MONTHS, THE FOOD YOU BOUGHT JUST DIDN'T LAST AND YOU DIDN'T HAVE MONEY TO GET MORE.: NOT ASKED

## 2021-03-19 SDOH — ECONOMIC STABILITY: FOOD INSECURITY: WITHIN THE PAST 12 MONTHS, YOU WORRIED THAT YOUR FOOD WOULD RUN OUT BEFORE YOU GOT MONEY TO BUY MORE.: NOT ASKED

## 2021-03-19 SDOH — ECONOMIC STABILITY: INCOME INSECURITY: HOW HARD IS IT FOR YOU TO PAY FOR THE VERY BASICS LIKE FOOD, HOUSING, MEDICAL CARE, AND HEATING?: NOT ASKED

## 2021-03-19 SDOH — ECONOMIC STABILITY: TRANSPORTATION INSECURITY
IN THE PAST 12 MONTHS, HAS THE LACK OF TRANSPORTATION KEPT YOU FROM MEDICAL APPOINTMENTS OR FROM GETTING MEDICATIONS?: NOT ASKED

## 2021-03-19 ASSESSMENT — MIFFLIN-ST. JEOR: SCORE: 1791.26

## 2021-03-19 ASSESSMENT — ENCOUNTER SYMPTOMS
PALPITATIONS: 0
NAUSEA: 0
BREAST MASS: 0
JOINT SWELLING: 1
PARESTHESIAS: 0
FREQUENCY: 0
DIZZINESS: 0
WEAKNESS: 0
DYSURIA: 0
ARTHRALGIAS: 1
EYE PAIN: 0
FEVER: 0
NERVOUS/ANXIOUS: 0
SORE THROAT: 0
COUGH: 0
MYALGIAS: 0
DIARRHEA: 0
ABDOMINAL PAIN: 0
CONSTIPATION: 0
CHILLS: 0
HEMATOCHEZIA: 0
HEARTBURN: 0
HEMATURIA: 0
SHORTNESS OF BREATH: 0

## 2021-03-19 NOTE — PATIENT INSTRUCTIONS

## 2021-03-19 NOTE — PROGRESS NOTES
SUBJECTIVE:   CC: Vani Daniel is an 60 year old woman who presents for preventive health visit.     Patient has been advised of split billing requirements and indicates understanding: Yes  Healthy Habits:     Getting at least 3 servings of Calcium per day:  Yes    Bi-annual eye exam:  Yes    Dental care twice a year:  Yes    Sleep apnea or symptoms of sleep apnea:  Sleep apnea    Diet:  Low salt, Carbohydrate counting and Gluten-free/reduced    Frequency of exercise:  2-3 days/week    Duration of exercise:  15-30 minutes    Taking medications regularly:  Yes    Medication side effects:  None    PHQ-2 Total Score: 0    Additional concerns today:  Yes  -itching in ear ongoing getting worse   Exercising with treadmill and outdoor walking.  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 Yes    Have you ever done Advance Care Planning? (For example, a Health Directive, POLST, or a discussion with a medical provider or your loved ones about your wishes): Yes, advance care planning is on file.    Social History     Tobacco Use     Smoking status: Never Smoker     Smokeless tobacco: Never Used   Substance Use Topics     Alcohol use: No       Alcohol Use 3/18/2021   Prescreen: >3 drinks/day or >7 drinks/week? No   Prescreen: >3 drinks/day or >7 drinks/week? -       Any new diagnosis of family breast, ovarian, or bowel cancer? No     Reviewed orders with patient.  Reviewed health maintenance and updated orders accordingly - Yes  Labs reviewed in  EPIC    Breast CA Risk Screening:  No flowsheet data found.  Had genetic testing for cancer - Cowden syndrome in sibs but she is negative    Mammogram Screening: Recommended mammography every 1 years with patient discussion and risk factor consideration  Pertinent mammograms are reviewed under the imaging tab.    History of abnormal Pap smear: NO - age 30-65 PAP every 5 years with negative HPV co-testing recommended     Reviewed and updated as needed this visit by clinical staff  Tobacco  Allergies  Meds  Problems  Med Hx  Surg Hx  Fam Hx  Soc Hx          Reviewed and updated as needed this visit by Provider  Tobacco  Allergies  Meds  Problems  Med Hx  Surg Hx  Fam Hx           Review of Systems   Constitutional: Negative for chills and fever.   HENT: Negative for congestion, ear pain, hearing loss and sore throat.    Eyes: Negative for pain and visual disturbance.   Respiratory: Negative for cough and shortness of breath.    Cardiovascular: Positive for peripheral edema. Negative for chest pain and palpitations.   Gastrointestinal: Negative for abdominal pain, constipation, diarrhea, heartburn, hematochezia and nausea.   Breasts:  Negative for tenderness, breast mass and discharge.   Genitourinary: Negative for dysuria, frequency, genital sores, hematuria, pelvic pain, urgency, vaginal bleeding and vaginal discharge.   Musculoskeletal: Positive for arthralgias and joint swelling. Negative for myalgias.   Skin: Negative for rash.   Neurological: Negative for dizziness, weakness and paresthesias.   Psychiatric/Behavioral: Negative for mood changes. The patient is not nervous/anxious.    skin itching and rash over top of ear and inside ears itch  Swelling - left knee.  Notes more with getting up and down from floor playing with grandchild.  Has distant history of knee injury 25 yrs ago.It doesn't hurt but achy with swelling.  It does sometimes give way when doing stairs  Nodules on fingers and having  "some pain in joints.  Ankle swelling resolved since stopped metformin       OBJECTIVE:   /78 (BP Location: Right arm, Patient Position: Chair, Cuff Size: Adult Large)   Pulse 88   Temp 97.4  F (36.3  C) (Tympanic)   Resp 18   Ht 1.638 m (5' 4.5\")   Wt 123.8 kg (273 lb)   LMP 01/25/2009   SpO2 99%   BMI 46.14 kg/m    Physical Exam  GENERAL: healthy, alert and no distress  EYES: Eyes grossly normal to inspection, PERRL and conjunctivae and sclerae normal  HENT: ear canals and TM's normal, nose and mouth without ulcers or lesions  NECK: no adenopathy, no asymmetry, masses, or scars and thyroid normal to palpation  RESP: lungs clear to auscultation - no rales, rhonchi or wheezes  CV: regular rate and rhythm, normal S1 S2, no S3 or S4, no murmur, click or rub, no peripheral edema and peripheral pulses strong  ABDOMEN: soft, nontender, no hepatosplenomegaly, no masses and bowel sounds normal   (female): normal female external genitalia, normal urethral meatus, vaginal mucosa pink, moist, well rugated, and normal cervix without masses or discharge  MS: no gross musculoskeletal defects noted, no edema  SKIN: no suspicious lesions.  erythematous plaque with white scale above left ear  NEURO: Normal strength and tone, mentation intact and speech normal  PSYCH: mentation appears normal, affect normal/bright    Diagnostic Test Results:  Labs reviewed in Epic    ASSESSMENT/PLAN:   1. Routine general medical examination at a health care facility  Routine health education discussed: calcium, diet, exercise, weight, safety.     2. Screening for HIV (human immunodeficiency virus)  Low risk, defer    3. Screening for malignant neoplasm of cervix    - Pap imaged thin layer screen with HPV - recommended age 30 - 65 years (select HPV order below)  - HPV High Risk Types DNA Cervical    4. Polycystic ovarian syndrome  Discussed diet and exercise, refer to weight management program    5. obesity  Discussed diet and " "medications, including saxenda.  Interested in cmoprehensive weight loss program, referred    6. Chronic pain of left knee  Refer for PT  - PHYSICAL THERAPY REFERRAL; Future    7. Thyroid nodule  Reviewed last endo note, recommended follow-up US which was ordered  - US Thyroid; Future        Patient has been advised of split billing requirements and indicates understanding: No  COUNSELING:  Reviewed preventive health counseling, as reflected in patient instructions    Estimated body mass index is 46.14 kg/m  as calculated from the following:    Height as of this encounter: 1.638 m (5' 4.5\").    Weight as of this encounter: 123.8 kg (273 lb).    Weight management plan: Discussed healthy diet and exercise guidelines    She reports that she has never smoked. She has never used smokeless tobacco.    The 10-year ASCVD risk score ([place]vin COURTNEY Jr., et al., 2013) is: 4.2%    Values used to calculate the score:      Age: 60 years      Sex: Female      Is Non- : No      Diabetic: No      Tobacco smoker: No      Systolic Blood Pressure: 126 mmHg      Is BP treated: No      HDL Cholesterol: 52 mg/dL      Total Cholesterol: 213 mg/dL     Counseling Resources:  ATP IV Guidelines  Pooled Cohorts Equation Calculator  Breast Cancer Risk Calculator  BRCA-Related Cancer Risk Assessment: FHS-7 Tool  FRAX Risk Assessment  ICSI Preventive Guidelines  Dietary Guidelines for Americans, 2010  USDA's MyPlate  ASA Prophylaxis  Lung CA Screening    Bianca Arias MD  [facility] [place]  "

## 2021-03-21 RX ORDER — TRIAMCINOLONE ACETONIDE 1 MG/G
CREAM TOPICAL 2 TIMES DAILY
Qty: 30 G | Refills: 3 | Status: SHIPPED | OUTPATIENT
Start: 2021-03-21 | End: 2022-06-17

## 2021-03-23 LAB
COPATH REPORT: NORMAL
PAP: NORMAL

## 2021-03-25 LAB
FINAL DIAGNOSIS: NORMAL
HPV HR 12 DNA CVX QL NAA+PROBE: NEGATIVE
HPV16 DNA SPEC QL NAA+PROBE: NEGATIVE
HPV18 DNA SPEC QL NAA+PROBE: NEGATIVE
SPECIMEN DESCRIPTION: NORMAL
SPECIMEN SOURCE CVX/VAG CYTO: NORMAL

## 2021-04-07 ENCOUNTER — HOSPITAL ENCOUNTER (OUTPATIENT)
Dept: ULTRASOUND IMAGING | Facility: CLINIC | Age: 61
Discharge: HOME | End: 2021-04-07
Attending: INTERNAL MEDICINE | Admitting: INTERNAL MEDICINE
Payer: COMMERCIAL

## 2021-04-07 DIAGNOSIS — E04.1 THYROID NODULE: ICD-10-CM

## 2021-04-07 PROCEDURE — 76536 US EXAM OF HEAD AND NECK: CPT

## 2021-09-04 ENCOUNTER — HEALTH MAINTENANCE LETTER (OUTPATIENT)
Age: 61
End: 2021-09-04

## 2021-09-17 ENCOUNTER — HOSPITAL ENCOUNTER (OUTPATIENT)
Dept: MAMMOGRAPHY | Facility: CLINIC | Age: 61
Discharge: HOME | End: 2021-09-17
Attending: INTERNAL MEDICINE | Admitting: INTERNAL MEDICINE
Payer: COMMERCIAL

## 2021-09-17 DIAGNOSIS — Z12.31 VISIT FOR SCREENING MAMMOGRAM: ICD-10-CM

## 2021-09-17 PROCEDURE — 77063 BREAST TOMOSYNTHESIS BI: CPT

## 2022-01-01 ENCOUNTER — TRANSFERRED RECORDS (OUTPATIENT)
Dept: HEALTH INFORMATION MANAGEMENT | Facility: CLINIC | Age: 62
End: 2022-01-01
Payer: COMMERCIAL

## 2022-01-01 LAB — RETINOPATHY: NEGATIVE

## 2022-05-11 ENCOUNTER — VIRTUAL VISIT (OUTPATIENT)
Dept: FAMILY MEDICINE | Facility: CLINIC | Age: 62
End: 2022-05-11
Payer: COMMERCIAL

## 2022-05-11 DIAGNOSIS — U07.1 CLINICAL DIAGNOSIS OF COVID-19: Primary | ICD-10-CM

## 2022-05-11 PROCEDURE — 99213 OFFICE O/P EST LOW 20 MIN: CPT | Mod: 95 | Performed by: NURSE PRACTITIONER

## 2022-05-11 RX ORDER — GUAIFENESIN 1200 MG/1
1200 TABLET, EXTENDED RELEASE ORAL 2 TIMES DAILY
Qty: 60 TABLET | Refills: 0 | Status: CANCELLED | OUTPATIENT
Start: 2022-05-11

## 2022-05-11 RX ORDER — IPRATROPIUM BROMIDE 21 UG/1
2 SPRAY, METERED NASAL EVERY 12 HOURS
Qty: 30 ML | Refills: 0 | Status: CANCELLED | OUTPATIENT
Start: 2022-05-11

## 2022-05-11 RX ORDER — BENZONATATE 100 MG/1
100 CAPSULE ORAL 3 TIMES DAILY PRN
Qty: 30 CAPSULE | Refills: 0 | Status: CANCELLED | OUTPATIENT
Start: 2022-05-11

## 2022-05-11 NOTE — PATIENT INSTRUCTIONS

## 2022-05-11 NOTE — PROGRESS NOTES
Parish is a 61 year old who is being evaluated via a billable video visit.      How would you like to obtain your AVS? MyChart  If the video visit is dropped, the invitation should be resent by: Text to cell phone: [phone]   Will anyone else be joining your video visit? No  Video Start Time: 1356    Assessment & Plan     Clinical diagnosis of COVID-19  She is over 6 days of symptoms so does not qualify for antivirals. Counseled on self-care measures including: OTC acetaminophen or guaifenesin PRN, rest, self quarantine, masking, and red flags of when to seek urgent medical attention including difficulty breathing, blue face/lips, spO2 below 92% at rest.    See Patient Instructions    Return in about 2 weeks (around 5/25/2022) for follow up if symptoms worsen or do not improve.    MICHELLE Lutz CNP  [facility]    Subjective   Parish is a 61 year old who presents for the following health issues     HPI     Acute Illness  Acute illness concerns: Sore throat started on 5/5/22 ( Friday). Rapid at home test was done Friday and was positive. PCR test done Saturday(5/6/22) and was positive.   Onset/Duration: 6 days   Symptoms: No sense of smell. Patient can taste still.   Fever: YES. Temporal 100s was the highest.   Chills/Sweats: YES. Both.   Headache (location?): YES  Sinus Pressure: YES  Conjunctivitis:  no  Ear Pain: no  Rhinorrhea: YES  Congestion: YES  Sore Throat: YES   Cough: YES  Wheeze: no  Decreased Appetite: no  Nausea: YES  Vomiting: no  Diarrhea: YES  Dysuria/Freq.: no  Dysuria or Hematuria: no  Fatigue/Achiness: YES.both. Severe body aches.   Sick/Strep Exposure: YES. Whole family is positive for covid.   Therapies tried and outcome: Advil     Review of Systems   Constitutional, HEENT, cardiovascular, pulmonary, GI, , musculoskeletal, neuro, skin, endocrine and psych systems are negative, except as otherwise noted.      Objective           Vitals:  No vitals were  obtained today due to virtual visit.    Physical Exam   GENERAL: Healthy, alert and no distress  EYES: Eyes grossly normal to inspection.  No discharge or erythema, or obvious scleral/conjunctival abnormalities.  RESP: No audible wheeze, cough, or visible cyanosis.  No visible retractions or increased work of breathing.    SKIN: Visible skin clear. No significant rash, abnormal pigmentation or lesions.  NEURO: Cranial nerves grossly intact.  Mentation and speech appropriate for age.  PSYCH: Mentation appears normal, affect normal/bright, judgement and insight intact, normal speech and appearance well-groomed.            Video-Visit Details    Type of service:  Video Visit    Video End Time:2:08 PM    Originating Location (pt. Location): Home    Distant Location (provider location):  [facility]     Platform used for Video Visit: [employer]

## 2022-06-10 ENCOUNTER — LAB (OUTPATIENT)
Dept: LAB | Facility: CLINIC | Age: 62
End: 2022-06-10
Payer: COMMERCIAL

## 2022-06-10 ENCOUNTER — TELEPHONE (OUTPATIENT)
Dept: PEDIATRICS | Facility: CLINIC | Age: 62
End: 2022-06-10

## 2022-06-10 DIAGNOSIS — I10 HYPERTENSION GOAL BP (BLOOD PRESSURE) < 140/90: ICD-10-CM

## 2022-06-10 DIAGNOSIS — R73.01 IMPAIRED FASTING GLUCOSE: ICD-10-CM

## 2022-06-10 DIAGNOSIS — E66.01 MORBID OBESITY DUE TO EXCESS CALORIES (H): ICD-10-CM

## 2022-06-10 DIAGNOSIS — E66.01 MORBID OBESITY DUE TO EXCESS CALORIES (H): Primary | ICD-10-CM

## 2022-06-10 LAB
ANION GAP SERPL CALCULATED.3IONS-SCNC: 6 MMOL/L (ref 3–14)
BUN SERPL-MCNC: 12 MG/DL (ref 7–30)
CALCIUM SERPL-MCNC: 9.3 MG/DL (ref 8.5–10.1)
CHLORIDE BLD-SCNC: 106 MMOL/L (ref 94–109)
CO2 SERPL-SCNC: 27 MMOL/L (ref 20–32)
CREAT SERPL-MCNC: 0.83 MG/DL (ref 0.52–1.04)
GFR SERPL CREATININE-BSD FRML MDRD: 79 ML/MIN/1.73M2
GLUCOSE BLD-MCNC: 163 MG/DL (ref 70–99)
HBA1C MFR BLD: 6.5 % (ref 0–5.6)
POTASSIUM BLD-SCNC: 4.1 MMOL/L (ref 3.4–5.3)
SODIUM SERPL-SCNC: 139 MMOL/L (ref 133–144)

## 2022-06-10 PROCEDURE — 36415 COLL VENOUS BLD VENIPUNCTURE: CPT

## 2022-06-10 PROCEDURE — 83036 HEMOGLOBIN GLYCOSYLATED A1C: CPT

## 2022-06-10 PROCEDURE — 80048 BASIC METABOLIC PNL TOTAL CA: CPT

## 2022-06-10 NOTE — TELEPHONE ENCOUNTER
Dr. Arias-Patient came in today for lab only, no orders, annual review of HM is not yet signed off, please place orders, patient asking for same labs as last year plus A1C.

## 2022-06-11 ENCOUNTER — HEALTH MAINTENANCE LETTER (OUTPATIENT)
Age: 62
End: 2022-06-11

## 2022-06-13 SDOH — ECONOMIC STABILITY: INCOME INSECURITY: IN THE LAST 12 MONTHS, WAS THERE A TIME WHEN YOU WERE NOT ABLE TO PAY THE MORTGAGE OR RENT ON TIME?: NO

## 2022-06-13 SDOH — HEALTH STABILITY: PHYSICAL HEALTH: ON AVERAGE, HOW MANY DAYS PER WEEK DO YOU ENGAGE IN MODERATE TO STRENUOUS EXERCISE (LIKE A BRISK WALK)?: 2 DAYS

## 2022-06-13 SDOH — HEALTH STABILITY: PHYSICAL HEALTH: ON AVERAGE, HOW MANY MINUTES DO YOU ENGAGE IN EXERCISE AT THIS LEVEL?: 20 MIN

## 2022-06-13 ASSESSMENT — LIFESTYLE VARIABLES
SKIP TO QUESTIONS 9-10: 1
HOW OFTEN DO YOU HAVE A DRINK CONTAINING ALCOHOL: MONTHLY OR LESS
AUDIT-C TOTAL SCORE: 1
HOW MANY STANDARD DRINKS CONTAINING ALCOHOL DO YOU HAVE ON A TYPICAL DAY: 1 OR 2
HOW OFTEN DO YOU HAVE SIX OR MORE DRINKS ON ONE OCCASION: NEVER

## 2022-06-13 ASSESSMENT — SOCIAL DETERMINANTS OF HEALTH (SDOH)
HOW OFTEN DO YOU GET TOGETHER WITH FRIENDS OR RELATIVES?: MORE THAN THREE TIMES A WEEK
HOW OFTEN DO YOU ATTEND CHURCH OR RELIGIOUS SERVICES?: PATIENT DECLINED
DO YOU BELONG TO ANY CLUBS OR ORGANIZATIONS SUCH AS CHURCH GROUPS UNIONS, FRATERNAL OR ATHLETIC GROUPS, OR SCHOOL GROUPS?: PATIENT DECLINED
IN A TYPICAL WEEK, HOW MANY TIMES DO YOU TALK ON THE PHONE WITH FAMILY, FRIENDS, OR NEIGHBORS?: MORE THAN THREE TIMES A WEEK

## 2022-06-17 ENCOUNTER — OFFICE VISIT (OUTPATIENT)
Dept: PEDIATRICS | Facility: CLINIC | Age: 62
End: 2022-06-17
Payer: COMMERCIAL

## 2022-06-17 ENCOUNTER — MYC MEDICAL ADVICE (OUTPATIENT)
Dept: PEDIATRICS | Facility: CLINIC | Age: 62
End: 2022-06-17

## 2022-06-17 VITALS
TEMPERATURE: 98.8 F | HEIGHT: 64 IN | WEIGHT: 277 LBS | RESPIRATION RATE: 16 BRPM | SYSTOLIC BLOOD PRESSURE: 114 MMHG | DIASTOLIC BLOOD PRESSURE: 78 MMHG | BODY MASS INDEX: 47.29 KG/M2 | OXYGEN SATURATION: 98 % | HEART RATE: 87 BPM

## 2022-06-17 DIAGNOSIS — E11.9 TYPE 2 DIABETES MELLITUS WITHOUT COMPLICATION, WITHOUT LONG-TERM CURRENT USE OF INSULIN (H): ICD-10-CM

## 2022-06-17 DIAGNOSIS — L98.9 SKIN LESION: ICD-10-CM

## 2022-06-17 DIAGNOSIS — Z12.11 SCREEN FOR COLON CANCER: ICD-10-CM

## 2022-06-17 DIAGNOSIS — G47.33 OSA (OBSTRUCTIVE SLEEP APNEA): ICD-10-CM

## 2022-06-17 DIAGNOSIS — E28.2 POLYCYSTIC OVARIAN SYNDROME: ICD-10-CM

## 2022-06-17 DIAGNOSIS — E66.813 CLASS 3 SEVERE OBESITY DUE TO EXCESS CALORIES WITH SERIOUS COMORBIDITY AND BODY MASS INDEX (BMI) OF 45.0 TO 49.9 IN ADULT (H): ICD-10-CM

## 2022-06-17 DIAGNOSIS — L40.9 PSORIASIS: ICD-10-CM

## 2022-06-17 DIAGNOSIS — E66.01 CLASS 3 SEVERE OBESITY DUE TO EXCESS CALORIES WITH SERIOUS COMORBIDITY AND BODY MASS INDEX (BMI) OF 45.0 TO 49.9 IN ADULT (H): ICD-10-CM

## 2022-06-17 DIAGNOSIS — Z00.00 ROUTINE GENERAL MEDICAL EXAMINATION AT A HEALTH CARE FACILITY: Primary | ICD-10-CM

## 2022-06-17 PROCEDURE — 90677 PCV20 VACCINE IM: CPT | Performed by: INTERNAL MEDICINE

## 2022-06-17 PROCEDURE — 90471 IMMUNIZATION ADMIN: CPT | Performed by: INTERNAL MEDICINE

## 2022-06-17 PROCEDURE — 99396 PREV VISIT EST AGE 40-64: CPT | Mod: 25 | Performed by: INTERNAL MEDICINE

## 2022-06-17 PROCEDURE — 99214 OFFICE O/P EST MOD 30 MIN: CPT | Mod: 25 | Performed by: INTERNAL MEDICINE

## 2022-06-17 RX ORDER — TRIAMCINOLONE ACETONIDE 1 MG/G
CREAM TOPICAL 2 TIMES DAILY
Qty: 30 G | Refills: 3 | Status: SHIPPED | OUTPATIENT
Start: 2022-06-17

## 2022-06-17 RX ORDER — METFORMIN HCL 500 MG
TABLET, EXTENDED RELEASE 24 HR ORAL
Qty: 360 TABLET | Refills: 1 | Status: SHIPPED | OUTPATIENT
Start: 2022-06-17 | End: 2022-12-05

## 2022-06-17 ASSESSMENT — PAIN SCALES - GENERAL: PAINLEVEL: NO PAIN (0)

## 2022-06-17 NOTE — PROGRESS NOTES
SUBJECTIVE:   CC: Vani Daniel is an 61 year old woman who presents for preventive health visit.     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  Lost sense of smell but starting to come back.    Is watching [employer] 2 days/wk in addition to subbing    New diabetes - A1C 6.5.  Was on metformin and started keto diet and doing well so then stopped metformin.  Relaxed the keto diet and stayed off metformin and sugars up.  Lost 13 lbs initially but it stopped.  Is doing treadmill now regularly.  Silver sneakers on tv too.      JORGE - just got a new machine.  Is auto-adjusting.  No daytime sleepiness.  Uses cpap every night.  No changes in cpap in 6 yrs    Today's PHQ-2 Score:   PHQ-2 ( 1999 Pfizer) 6/13/2022   Q1: Little interest or pleasure in doing things 0   Q2: Feeling down, depressed or hopeless 0   PHQ-2 Score 0   PHQ-2 Total Score (12-17 Years)- Positive if 3 or more points; Administer PHQ-A if positive -   Q1: Little interest or pleasure in doing things Not at all   Q2: Feeling down, depressed or hopeless Not at all   PHQ-2 Score 0     Abuse: Current or Past (Physical, Sexual or Emotional) - No  Do you feel safe in your environment? Yes    Social History     Tobacco Use     Smoking status: Never Smoker     Smokeless tobacco: Never Used   Substance Use Topics     Alcohol use: No       Alcohol Use 6/13/2022   Prescreen: >3 drinks/day or >7 drinks/week? No   Prescreen: >3 drinks/day or >7 drinks/week? -     Reviewed orders with patient.   Reviewed health maintenance and updated orders accordingly - Yes  Labs reviewed in EPIC    Breast Cancer Screening:    FHS-7:   Breast CA Risk Assessment (FHS-7) 9/17/2021 6/13/2022   Did any of your first-degree relatives have breast or ovarian cancer? Yes Yes   Did any of your relatives have bilateral breast cancer? No Unknown   Did any man in your family have breast cancer? No Unknown   Did any woman in your family have breast and ovarian cancer? No Yes   Did any woman in your family have breast cancer before age 50 y? Yes Yes   Do you have 2 or more relatives with breast and/or ovarian cancer? Yes Yes   Do you have 2 or more relatives with breast and/or bowel cancer? No Unknown   patient tested and was negative    Mammogram Screening: Recommended annual mammography  Pertinent mammograms are reviewed under the imaging tab.    History of abnormal Pap smear: NO - age 30-65 PAP every 5 years with negative HPV co-testing recommended  PAP / HPV Latest Ref Rng & Units 3/19/2021   PAP (Historical) - NIL   HPV16 NEG:Negative Negative   HPV18 NEG:Negative Negative   HRHPV NEG:Negative Negative     Reviewed and updated as needed this visit by clinical staff   Tobacco  Allergies  Meds     Fam Hx            Reviewed and updated as needed this visit by Provider     Meds     Fam Hx             Review of Systems  CONSTITUTIONAL: NEGATIVE for fever, chills, change in weight  INTEGUMENTARY/SKIN: NEGATIVE for worrisome rashes, moles.  POS for lesion left temple, was thicker and used wart cream on it  EYES: NEGATIVE for vision changes or irritation  ENT: NEGATIVE for ear, mouth and throat problems  RESP: NEGATIVE for significant cough or SOB  BREAST: NEGATIVE for masses, tenderness or discharge  CV: POS for occasional palpitations since covid in May.  NEGATIVE for chest pain,or peripheral edema  GI: NEGATIVE for nausea, abdominal pain, heartburn, or change in bowel habits  : NEGATIVE for unusual urinary or vaginal symptoms.  "No vaginal bleeding.  MUSCULOSKELETAL:POSITIVE  for knee pain - improving with home exercises.  Right ankle swells if inactive  NEURO: NEGATIVE for weakness, dizziness or paresthesias  PSYCHIATRIC: NEGATIVE for changes in mood or affect      OBJECTIVE:   /78   Pulse 87   Temp 98.8  F (37.1  C) (Temporal)   Resp 16   Ht 1.626 m (5' 4\")   Wt 125.6 kg (277 lb)   LMP 01/25/2009   SpO2 98%   BMI 47.55 kg/m    Physical Exam  GENERAL: healthy, alert, no distress and over weight  EYES: Eyes grossly normal to inspection, PERRL and conjunctivae and sclerae normal  HENT: ear canals and TM's normal, nose and mouth without ulcers or lesions  NECK: no adenopathy, no asymmetry, masses, or scars and thyroid normal to palpation  RESP: lungs clear to auscultation - no rales, rhonchi or wheezes  CV: regular rate and rhythm, normal S1 S2, no S3 or S4, no murmur, click or rub, no peripheral edema and peripheral pulses strong  ABDOMEN: soft, nontender, no hepatosplenomegaly, no masses and bowel sounds normal  MS: no gross musculoskeletal defects noted, no edema  SKIN: crusted papule left templ  NEURO: Normal strength and tone, mentation intact and speech normal  PSYCH: mentation appears normal, affect normal/bright  Foot Exam: normal DP and PT pulses, no trophic changes or ulcerative lesions and normal sensory exam   Diagnostic Test Results:  Labs reviewed in Epic    ASSESSMENT/PLAN:   Routine general medical examination at a health care facility  Routine health education discussed: calcium, diet, exercise, weight, safety.     Type 2 diabetes mellitus without complication, without long-term current use of insulin (H)  Discussed new diagnosis, A1C just into diabetic range.  Does not have to be on medication as is controlled but could if interested in it helping with weight loss.  Discussed diet and exercise.  Checked on medication cost and prefers to start metformin - see orders  - PNEUMOCOCCAL 20 VALENT CONJUGATE (PREVNAR " "20)    Polycystic ovarian syndrome  Post-menopausal    Class 3 severe obesity due to excess calories with serious comorbidity and body mass index (BMI) of 45.0 to 49.9 in adult (H)  Discussed medications, gastric bypass.  Prefers to try medication and avoid surgery.  Will check on medication costs    JORGE (obstructive sleep apnea)  Continue auto-adjusting cpap    Psoriasis  refill  - triamcinolone (KENALOG) 0.1 % external cream; Apply topically 2 times daily    Skin lesion  Follow-up with derm  - Adult Dermatology Referral; Future    Screen for colon cancer  Has colonoscopy scheduled July 11, 2022    COUNSELING:  Reviewed preventive health counseling, as reflected in patient instructions    Estimated body mass index is 47.55 kg/m  as calculated from the following:    Height as of this encounter: 1.626 m (5' 4\").    Weight as of this encounter: 125.6 kg (277 lb).    Weight management plan: Discussed healthy diet and exercise guidelines discussed medication options    She reports that she has never smoked. She has never used smokeless tobacco.    Counseling Resources:  ATP IV Guidelines  Pooled Cohorts Equation Calculator  Breast Cancer Risk Calculator  BRCA-Related Cancer Risk Assessment: FHS-7 Tool  FRAX Risk Assessment  ICSI Preventive Guidelines  Dietary Guidelines for Americans, 2010  USDA's MyPlate  ASA Prophylaxis  Lung CA Screening    51 mins in total with the patient on the day of visit.  20 mins spent in discussion of diabetes.    Bianca Arias MD  [facility]  "

## 2022-06-17 NOTE — TELEPHONE ENCOUNTER
Dr. Arias,    Please see MC message from pt regarding diabetic medications    Thank you  Britt Corey RN on 6/17/2022 at 1:30 PM

## 2022-06-17 NOTE — PATIENT INSTRUCTIONS
For olfactory training, check out this website: https://[email or web address]/learn-us/smell-[email or web address]#:~:text=If%20you%E2%80%99ve%20lost%20your%20sense%20of%20taste%20and,concentrating%20on%20what%20you%E2%80%99re%20doing.%20It%E2%80%99s%20that%20easy.    For weight loss, I recommend trying a diabetes medication.  Either metformin or an injection like victoza or trulicity or semaglutide or a cousin.  Let me know what you want to try after finding out cost.    Schedule labs in 3 months.  You can schedule on [employer] or or by calling us at [phone].     Preventive Health Recommendations  Female Ages 50 - 64    Yearly exam: See your health care provider every year in order to  Review health changes.   Discuss preventive care.    Review your medicines if your doctor has prescribed any.    Get a Pap test every three years (unless you have an abnormal result and your provider advises testing more often).  If you get Pap tests with HPV test, you only need to test every 5 years, unless you have an abnormal result.     Have a mammogram every 1 to 2 years.  Have a colonoscopy as scheduled  Have a cholesterol test every year  Have a diabetes test (fasting glucose) every three years. If you are at risk for diabetes, you should have this test more often.       Shots: Get a flu shot each year. Get a tetanus shot every 10 years.    Nutrition:   Eat at least 5 servings of fruits and vegetables each day.  Eat whole-grain bread, whole-wheat pasta and brown rice instead of white grains and rice.  Get adequate Calcium and Vitamin D.     Lifestyle  Exercise at least 150 minutes a week (30 minutes a day, 5 days a week). This will help you control your weight and prevent disease.  Limit alcohol to one drink per day.  No smoking.   Wear sunscreen to prevent skin cancer.   See your dentist every six months for an exam and cleaning.  See your eye doctor every year

## 2022-07-11 ENCOUNTER — TRANSFERRED RECORDS (OUTPATIENT)
Dept: HEALTH INFORMATION MANAGEMENT | Facility: CLINIC | Age: 62
End: 2022-07-11

## 2022-09-08 ENCOUNTER — MYC MEDICAL ADVICE (OUTPATIENT)
Dept: PEDIATRICS | Facility: CLINIC | Age: 62
End: 2022-09-08

## 2022-09-08 DIAGNOSIS — E66.813 CLASS 3 SEVERE OBESITY DUE TO EXCESS CALORIES WITH SERIOUS COMORBIDITY AND BODY MASS INDEX (BMI) OF 45.0 TO 49.9 IN ADULT (H): ICD-10-CM

## 2022-09-08 DIAGNOSIS — E11.9 TYPE 2 DIABETES MELLITUS WITHOUT COMPLICATION, WITHOUT LONG-TERM CURRENT USE OF INSULIN (H): ICD-10-CM

## 2022-09-08 DIAGNOSIS — E28.2 POLYCYSTIC OVARIAN SYNDROME: Primary | ICD-10-CM

## 2022-09-08 DIAGNOSIS — E66.01 CLASS 3 SEVERE OBESITY DUE TO EXCESS CALORIES WITH SERIOUS COMORBIDITY AND BODY MASS INDEX (BMI) OF 45.0 TO 49.9 IN ADULT (H): ICD-10-CM

## 2022-09-19 ENCOUNTER — LAB (OUTPATIENT)
Dept: LAB | Facility: CLINIC | Age: 62
End: 2022-09-19
Payer: COMMERCIAL

## 2022-09-19 DIAGNOSIS — E66.813 CLASS 3 SEVERE OBESITY DUE TO EXCESS CALORIES WITH SERIOUS COMORBIDITY AND BODY MASS INDEX (BMI) OF 45.0 TO 49.9 IN ADULT (H): ICD-10-CM

## 2022-09-19 DIAGNOSIS — E28.2 POLYCYSTIC OVARIAN SYNDROME: ICD-10-CM

## 2022-09-19 DIAGNOSIS — E11.9 TYPE 2 DIABETES MELLITUS WITHOUT COMPLICATION, WITHOUT LONG-TERM CURRENT USE OF INSULIN (H): ICD-10-CM

## 2022-09-19 DIAGNOSIS — Z13.220 SCREENING FOR HYPERLIPIDEMIA: Primary | ICD-10-CM

## 2022-09-19 DIAGNOSIS — E66.01 CLASS 3 SEVERE OBESITY DUE TO EXCESS CALORIES WITH SERIOUS COMORBIDITY AND BODY MASS INDEX (BMI) OF 45.0 TO 49.9 IN ADULT (H): ICD-10-CM

## 2022-09-19 DIAGNOSIS — Z00.00 ROUTINE GENERAL MEDICAL EXAMINATION AT A HEALTH CARE FACILITY: ICD-10-CM

## 2022-09-19 LAB
CHOLEST SERPL-MCNC: 184 MG/DL
CREAT UR-MCNC: 40 MG/DL
FASTING STATUS PATIENT QL REPORTED: YES
HBA1C MFR BLD: 6 % (ref 0–5.6)
HDLC SERPL-MCNC: 45 MG/DL
LDLC SERPL CALC-MCNC: 109 MG/DL
MICROALBUMIN UR-MCNC: <5 MG/L
MICROALBUMIN/CREAT UR: NORMAL MG/G{CREAT}
NONHDLC SERPL-MCNC: 139 MG/DL
TRIGL SERPL-MCNC: 150 MG/DL

## 2022-09-19 PROCEDURE — 83036 HEMOGLOBIN GLYCOSYLATED A1C: CPT

## 2022-09-19 PROCEDURE — 36415 COLL VENOUS BLD VENIPUNCTURE: CPT

## 2022-09-19 PROCEDURE — 80061 LIPID PANEL: CPT

## 2022-09-19 PROCEDURE — 82306 VITAMIN D 25 HYDROXY: CPT

## 2022-09-19 PROCEDURE — 82043 UR ALBUMIN QUANTITATIVE: CPT

## 2022-09-20 ENCOUNTER — MYC MEDICAL ADVICE (OUTPATIENT)
Dept: PEDIATRICS | Facility: CLINIC | Age: 62
End: 2022-09-20

## 2022-09-20 DIAGNOSIS — E78.2 MIXED HYPERLIPIDEMIA: Primary | ICD-10-CM

## 2022-09-20 DIAGNOSIS — Z00.00 ROUTINE GENERAL MEDICAL EXAMINATION AT A HEALTH CARE FACILITY: Primary | ICD-10-CM

## 2022-09-20 RX ORDER — SIMVASTATIN 20 MG
20 TABLET ORAL AT BEDTIME
Qty: 90 TABLET | Refills: 3 | Status: SHIPPED | OUTPATIENT
Start: 2022-09-20 | End: 2023-10-03

## 2022-09-20 NOTE — TELEPHONE ENCOUNTER
Dr. Arias-Please comment on microalbumin results, patient doesn't understand what they mean, see mychart message.

## 2022-09-21 ENCOUNTER — HOSPITAL ENCOUNTER (OUTPATIENT)
Dept: MAMMOGRAPHY | Facility: CLINIC | Age: 62
Discharge: HOME | End: 2022-09-21
Attending: INTERNAL MEDICINE | Admitting: INTERNAL MEDICINE
Payer: COMMERCIAL

## 2022-09-21 DIAGNOSIS — Z12.31 VISIT FOR SCREENING MAMMOGRAM: ICD-10-CM

## 2022-09-21 PROCEDURE — 77067 SCR MAMMO BI INCL CAD: CPT

## 2022-09-22 LAB — DEPRECATED CALCIDIOL+CALCIFEROL SERPL-MC: 36 UG/L (ref 20–75)

## 2022-10-16 ENCOUNTER — HEALTH MAINTENANCE LETTER (OUTPATIENT)
Age: 62
End: 2022-10-16

## 2022-12-03 DIAGNOSIS — E11.9 TYPE 2 DIABETES MELLITUS WITHOUT COMPLICATION, WITHOUT LONG-TERM CURRENT USE OF INSULIN (H): ICD-10-CM

## 2022-12-05 RX ORDER — METFORMIN HCL 500 MG
TABLET, EXTENDED RELEASE 24 HR ORAL
Qty: 360 TABLET | Refills: 0 | Status: SHIPPED | OUTPATIENT
Start: 2022-12-05 | End: 2023-03-06

## 2023-01-25 ASSESSMENT — ENCOUNTER SYMPTOMS
DIARRHEA: 0
STIFFNESS: 1
NAUSEA: 0
NECK PAIN: 0
ARTHRALGIAS: 1
MYALGIAS: 1
MUSCLE CRAMPS: 0
JAUNDICE: 0
CONSTIPATION: 0
RECTAL PAIN: 0
BOWEL INCONTINENCE: 0
HEARTBURN: 0
ABDOMINAL PAIN: 1
BACK PAIN: 0
MUSCLE WEAKNESS: 0
JOINT SWELLING: 0
BLOATING: 0
BLOOD IN STOOL: 0
VOMITING: 0

## 2023-01-31 ENCOUNTER — TELEPHONE (OUTPATIENT)
Dept: ENDOCRINOLOGY | Facility: CLINIC | Age: 63
End: 2023-01-31

## 2023-01-31 ENCOUNTER — OFFICE VISIT (OUTPATIENT)
Dept: ENDOCRINOLOGY | Facility: CLINIC | Age: 63
End: 2023-01-31
Payer: COMMERCIAL

## 2023-01-31 VITALS
SYSTOLIC BLOOD PRESSURE: 140 MMHG | WEIGHT: 248 LBS | HEIGHT: 64 IN | TEMPERATURE: 97.8 F | HEART RATE: 96 BPM | BODY MASS INDEX: 42.34 KG/M2 | RESPIRATION RATE: 16 BRPM | OXYGEN SATURATION: 96 % | DIASTOLIC BLOOD PRESSURE: 90 MMHG

## 2023-01-31 DIAGNOSIS — E66.01 CLASS 3 SEVERE OBESITY DUE TO EXCESS CALORIES WITH SERIOUS COMORBIDITY AND BODY MASS INDEX (BMI) OF 45.0 TO 49.9 IN ADULT (H): ICD-10-CM

## 2023-01-31 DIAGNOSIS — E11.9 CONTROLLED TYPE 2 DIABETES MELLITUS WITHOUT COMPLICATION, WITHOUT LONG-TERM CURRENT USE OF INSULIN (H): ICD-10-CM

## 2023-01-31 DIAGNOSIS — E04.1 THYROID NODULE: ICD-10-CM

## 2023-01-31 DIAGNOSIS — E11.9 DIABETES MELLITUS WITHOUT COMPLICATION (H): ICD-10-CM

## 2023-01-31 DIAGNOSIS — E66.813 CLASS 3 SEVERE OBESITY DUE TO EXCESS CALORIES WITH SERIOUS COMORBIDITY AND BODY MASS INDEX (BMI) OF 45.0 TO 49.9 IN ADULT (H): ICD-10-CM

## 2023-01-31 DIAGNOSIS — E11.9 TYPE 2 DIABETES MELLITUS WITHOUT COMPLICATION, WITHOUT LONG-TERM CURRENT USE OF INSULIN (H): Primary | ICD-10-CM

## 2023-01-31 LAB
ALBUMIN SERPL BCG-MCNC: 4.5 G/DL (ref 3.5–5.2)
ALP SERPL-CCNC: 82 U/L (ref 35–104)
ALT SERPL W P-5'-P-CCNC: 27 U/L (ref 10–35)
ANION GAP SERPL CALCULATED.3IONS-SCNC: 11 MMOL/L (ref 7–15)
AST SERPL W P-5'-P-CCNC: 22 U/L (ref 10–35)
BILIRUB SERPL-MCNC: 0.5 MG/DL
BUN SERPL-MCNC: 13.2 MG/DL (ref 8–23)
CALCIUM SERPL-MCNC: 9.8 MG/DL (ref 8.8–10.2)
CHLORIDE SERPL-SCNC: 101 MMOL/L (ref 98–107)
CHOLEST SERPL-MCNC: 203 MG/DL
CREAT SERPL-MCNC: 0.82 MG/DL (ref 0.51–0.95)
CREAT UR-MCNC: 40.1 MG/DL
DEPRECATED HCO3 PLAS-SCNC: 27 MMOL/L (ref 22–29)
ERYTHROCYTE [DISTWIDTH] IN BLOOD BY AUTOMATED COUNT: 13 % (ref 10–15)
GFR SERPL CREATININE-BSD FRML MDRD: 79 ML/MIN/1.73M2
GLUCOSE SERPL-MCNC: 100 MG/DL (ref 70–99)
HBA1C MFR BLD: 6 % (ref 0–5.6)
HCT VFR BLD AUTO: 42.3 % (ref 35–47)
HDLC SERPL-MCNC: 54 MG/DL
HGB BLD-MCNC: 13.9 G/DL (ref 11.7–15.7)
LDLC SERPL CALC-MCNC: 122 MG/DL
MCH RBC QN AUTO: 28.7 PG (ref 26.5–33)
MCHC RBC AUTO-ENTMCNC: 32.9 G/DL (ref 31.5–36.5)
MCV RBC AUTO: 87 FL (ref 78–100)
MICROALBUMIN UR-MCNC: <12 MG/L
MICROALBUMIN/CREAT UR: NORMAL MG/G{CREAT}
NONHDLC SERPL-MCNC: 149 MG/DL
PLATELET # BLD AUTO: 277 10E3/UL (ref 150–450)
POTASSIUM SERPL-SCNC: 4.4 MMOL/L (ref 3.4–5.3)
PROT SERPL-MCNC: 7.9 G/DL (ref 6.4–8.3)
RBC # BLD AUTO: 4.85 10E6/UL (ref 3.8–5.2)
SODIUM SERPL-SCNC: 139 MMOL/L (ref 136–145)
TRIGL SERPL-MCNC: 136 MG/DL
TSH SERPL DL<=0.005 MIU/L-ACNC: 1.8 UIU/ML (ref 0.3–4.2)
WBC # BLD AUTO: 6.7 10E3/UL (ref 4–11)

## 2023-01-31 PROCEDURE — 85027 COMPLETE CBC AUTOMATED: CPT | Performed by: PHYSICIAN ASSISTANT

## 2023-01-31 PROCEDURE — 80061 LIPID PANEL: CPT | Performed by: PHYSICIAN ASSISTANT

## 2023-01-31 PROCEDURE — 82570 ASSAY OF URINE CREATININE: CPT | Performed by: PHYSICIAN ASSISTANT

## 2023-01-31 PROCEDURE — 36415 COLL VENOUS BLD VENIPUNCTURE: CPT | Performed by: PHYSICIAN ASSISTANT

## 2023-01-31 PROCEDURE — 80053 COMPREHEN METABOLIC PANEL: CPT | Performed by: PHYSICIAN ASSISTANT

## 2023-01-31 PROCEDURE — 83036 HEMOGLOBIN GLYCOSYLATED A1C: CPT | Performed by: PHYSICIAN ASSISTANT

## 2023-01-31 PROCEDURE — 82043 UR ALBUMIN QUANTITATIVE: CPT | Performed by: PHYSICIAN ASSISTANT

## 2023-01-31 PROCEDURE — 99203 OFFICE O/P NEW LOW 30 MIN: CPT | Performed by: PHYSICIAN ASSISTANT

## 2023-01-31 PROCEDURE — 84443 ASSAY THYROID STIM HORMONE: CPT | Performed by: PHYSICIAN ASSISTANT

## 2023-01-31 NOTE — TELEPHONE ENCOUNTER
PA Initiation    Medication: Axel 2 - Pending  Insurance Company: [employer] [place] - Phone [phone] Fax [phone]  Pharmacy Filling the Rx: [id number] [id number] AT [place] [address] & [facility]  Filling Pharmacy Phone:    Filling Pharmacy Fax:    Start Date: 1/31/2023    [id number]

## 2023-01-31 NOTE — LETTER
"    1/31/2023         RE: Vani Daniel  [address] [facility] [id number]        Dear Colleague,    Thank you for referring your patient, Vani Daniel, to the [facility]. Please see a copy of my visit note below.    Endocrine Consult note    Assessment/Plan :   1. Type 2 DM, uncomplicated. Parish is doing a great job in managing her diabetes. We discussed the role of diet, exercise, and medication in managing diabetes. She feels stable on metformin and only reports limited adverse effects with the medication. We discussed possible switching to a once weekly GLP-1 but she would like to continue with metformin, at this time.    She has a lot of questions regarding diet and exercise. She has been following the book \"Glucose Goddess\" and she is trying to stick with a 1700 calorie diet. She also walks on the treadmill every night for 10 min. I reassured her that she is doing really well. I would like her to visit with a CDE for a more in depth discussion of diet. A referral was sent today.     She also had questions regarding statin use. We discussed that diabetes increases the risk of heart attack and stroke, which is why there is a lower target LDL. She would like to see how her levels have changed since implementing dietary changes. We will repeat a lipid panel today. If her cholesterol has continued to improve, we will stay off of the simvastatin. However, if her numbers are increasing, I am going to recommend that she start the medication. Pt understands and we will be in touch through [employer].    Lastly, she would like to be able to more closely monitor her blood sugars. She has read about the [employer] CGMS. I will give her a sample today and a new prescription will be sent to her pharmacy. If she has any questions, she can contact me through [employer].    We will F/U in 3 mos.      I have independently reviewed and interpreted labs, imaging as indicated.      Chief " complaint:  Vani is a 62 year old female who comes to our office with questions regarding a new diagnosis of Type 2 DM.    I have reviewed Care Everywhere including [facility], Critical access hospital, [facility], [facility], [facility] , [facility], [facility] lab reports, imaging reports and provider notes as indicated.      HISTORY OF PRESENT ILLNESS  Parish has struggled with obesity and difficulty losing weight for most of her life. She was diagnosed with gestational diabetes in 1988, while pregnant with her second child. She was able to manage her blood sugars throughout the pregnancy with diet and SMBG. She carried the pregnancy to term and did not have any issues with future pregnancies. Years later she was told that she may have PCOS or pre-diabetes. Her PCP started her on metformin. Parish also really started to focus on diet and exercise and after some weight loss, she was able to discontinue the metformin in 2018. She was working with an endocrinologist in [place], [place] and she felt great. In 2019 she moved back to [place] and started working as a . She was not able to exercise as much and she went on to contract COVID, twice. She had routine F/U in June of 2022 and at that time she was told that her A1C was 6.5% and that she had diabetes. She was restarted on metformin and given a prescription for simvastatin. She never started the statin and she went back on a strict diet and exercise plan. She has dropped 30 lbs and a repeat A1C in Dec had improved to 6%. She feels like she is doing well but she is overwhelmed by all of the information that she has found on the Internet. She would like help with her diet and she would like to start using a CGMS to monitor her blood sugars.    Endocrine relevant labs are as follows:   Latest Reference Range & Units 06/10/22 09:28   Hemoglobin A1C 0.0 - 5.6 % 6.5 (H)   (H): Data is abnormally high   Latest Reference Range & Units 09/19/22 09:05    Hemoglobin A1C 0.0 - 5.6 % 6.0 (H)   (H): Data is abnormally high   Latest Reference Range & Units 09/19/22 09:31   Albumin Urine mg/L mg/L <5      Latest Reference Range & Units 09/19/22 09:05   Cholesterol <200 mg/dL 184      Latest Reference Range & Units 09/19/22 09:05   LDL Cholesterol Calculated <=100 mg/dL 109 (H)   (H): Data is abnormally high   Latest Reference Range & Units 09/19/22 09:05   Triglycerides <150 mg/dL 150 (H)   (H): Data is abnormally high    REVIEW OF SYSTEMS  Answers for HPI/ROS submitted by the patient on 1/25/2023  General Symptoms: No  Skin Symptoms: No  HENT Symptoms: No  EYE SYMPTOMS: No  HEART SYMPTOMS: No  LUNG SYMPTOMS: No  INTESTINAL SYMPTOMS: Yes  URINARY SYMPTOMS: No  GYNECOLOGIC SYMPTOMS: No  BREAST SYMPTOMS: No  SKELETAL SYMPTOMS: Yes  BLOOD SYMPTOMS: No  NERVOUS SYSTEM SYMPTOMS: No  MENTAL HEALTH SYMPTOMS: No  Heart burn or indigestion: No  Nausea: No  Vomiting: No  Abdominal pain: Yes  Bloating: No  Constipation: No  Diarrhea: No  Blood in stool: No  Black stools: No  Rectal or Anal pain: No  Fecal incontinence: No  Yellowing of skin or eyes: No  Vomit with blood: No  Change in stools: No  Back pain: No  Muscle aches: Yes  Neck pain: No  Swollen joints: No  Joint pain: Yes  Bone pain: No  Muscle cramps: No  Muscle weakness: No  Joint stiffness: Yes  Bone fracture: No    Endocrine: positive for thyroid disorder and diabetes  Skin: positive for nail changes, negative for, hair loss on scalp, hyperpigmentation  Eyes: positive for glasses, negative for, visual blurring  Ears/Nose/Throat: negative  Respiratory: No shortness of breath, dyspnea on exertion, cough, or hemoptysis  Cardiovascular: positive for lower extremity edema, negative for, irregular heart beat and chest pain  Gastrointestinal: negative for, nausea, vomiting, constipation and diarrhea  Genitourinary: negative for, dysuria, frequency and urgency  Musculoskeletal: negative for, joint swelling, joint stiffness and  muscular weakness  Neurologic: negative for, numbness or tingling of hands and numbness or tingling of feet  Psychiatric: negative for, anxiety and depression  Hematologic/Lymphatic/Immunologic: negative    Past Medical History  Past Medical History:   Diagnosis Date     Chest pain, unspecified 06/2006    neg stress test in [facility]     Diabetes mellitus, type 2 (H) 06/2019     Gestational diabetes mellitus, delivered 04/25/2008     Obesity, unspecified      Thyroid nodule 2018       Medications  Current Outpatient Medications   Medication Sig Dispense Refill     Cholecalciferol (VITAMIN D-3 PO) Take 1,000 Units by mouth daily       Continuous Blood Gluc Sensor (FREESTYLE THONY 2 SENSOR) MISC 1 each every 14 days 6 each 0     metFORMIN (GLUCOPHAGE XR) 500 MG 24 hr tablet TAKE 1 TAB DAILY IN AM FOR 1 WK THEN 2 TAB DAILY IN AM FOR 1 WK THEN 3 TAB DAILY IN AM FOR 1 WK THEN 4 TAB DAILY IN  tablet 0     simvastatin (ZOCOR) 20 MG tablet Take 1 tablet (20 mg) by mouth At Bedtime 90 tablet 3     triamcinolone (KENALOG) 0.1 % external cream Apply topically 2 times daily 30 g 3       Allergies  Allergies   Allergen Reactions     Sulfa Drugs Anaphylaxis     Dust Mites          Family History  family history includes Breast Cancer in her maternal aunt and maternal cousin; Breast Cancer (age of onset: 32) in her sister; Cancer - colorectal in her father; Cerebrovascular Disease in her mother; Cowden Syndrome in her brother and sister; Diabetes in her mother and other family members; Hypertension in her mother; Lipids in her mother; Prostate Cancer in her brother; Skin Cancer in her brother; Thyroid Cancer in her brother; Thyroid Disease in her brother and mother.    Social History  Social History     Tobacco Use     Smoking status: Never     Smokeless tobacco: Never   Vaping Use     Vaping Use: Never used   Substance Use Topics     Alcohol use: No     Drug use: No       Physical Exam  BP (!) 140/90   Pulse 96   Temp 97.8  F  "(36.6  C) (Oral)   Resp 16   Ht 1.619 m (5' 3.75\")   Wt 112.5 kg (248 lb)   LMP 01/25/2009   SpO2 96%   BMI 42.90 kg/m    Body mass index is 42.9 kg/m .  GENERAL :  In no apparent distress  SKIN: Normal color, normal temperature, texture.  No hirsutism, alopecia or purple striae.     EYES: PERRL, EOMI, No scleral icterus,  No proptosis, conjunctival redness, stare, retraction  NECK: No visible masses. No palpable adenopathy, or masses. No carotid bruits.   THYROID:  Normal, nontender, smooth / firm texture,  multiple small nodules bilaterally, no Bruit   RESP: Lungs clear to auscultation bilaterally  CARDIAC: Regular rate and rhythm, normal S1 S2, without murmurs, rubs or gallops         NEURO: awake, alert, responds appropriately to questions.  Cranial nerves intact.   Moves all extremities; Gait normal.  No tremor of the outstretched hand.   EXTREMITIES: No clubbing or cyanosis. 2+ peripheral edema bilateral, varicosities present on lower extremities, fingernails are dry and ridged  FOOT EXAM: dryness throughout foot and callous formation at heels. Strong pedal pulses and sensation intact to monofilament testing.      Again, thank you for allowing me to participate in the care of your patient.        Sincerely,        Susannah Park PA-C    "

## 2023-01-31 NOTE — PROGRESS NOTES
"Endocrine Consult note    Assessment/Plan :   1. Type 2 DM, uncomplicated. Parish is doing a great job in managing her diabetes. We discussed the role of diet, exercise, and medication in managing diabetes. She feels stable on metformin and only reports limited adverse effects with the medication. We discussed possible switching to a once weekly GLP-1 but she would like to continue with metformin, at this time.    She has a lot of questions regarding diet and exercise. She has been following the book \"Glucose Goddess\" and she is trying to stick with a 1700 calorie diet. She also walks on the treadmill every night for 10 min. I reassured her that she is doing really well. I would like her to visit with a CDE for a more in depth discussion of diet. A referral was sent today.     She also had questions regarding statin use. We discussed that diabetes increases the risk of heart attack and stroke, which is why there is a lower target LDL. She would like to see how her levels have changed since implementing dietary changes. We will repeat a lipid panel today. If her cholesterol has continued to improve, we will stay off of the simvastatin. However, if her numbers are increasing, I am going to recommend that she start the medication. Pt understands and we will be in touch through [facility].    Lastly, she would like to be able to more closely monitor her blood sugars. She has read about the [employer] CGMS. I will give her a sample today and a new prescription will be sent to her pharmacy. If she has any questions, she can contact me through [facility].    We will F/U in 3 mos.      I have independently reviewed and interpreted labs, imaging as indicated.      Chief complaint:  Vani is a 62 year old female who comes to our office with questions regarding a new diagnosis of Type 2 DM.    I have reviewed Care Everywhere including [facility], [facility], [facility], [facility], [place], [facility], [facility] , [facility], " [place] lab reports, imaging reports and provider notes as indicated.      HISTORY OF PRESENT ILLNESS  Parish has struggled with obesity and difficulty losing weight for most of her life. She was diagnosed with gestational diabetes in 1988, while pregnant with her second child. She was able to manage her blood sugars throughout the pregnancy with diet and SMBG. She carried the pregnancy to term and did not have any issues with future pregnancies. Years later she was told that she may have PCOS or pre-diabetes. Her PCP started her on metformin. Parish also really started to focus on diet and exercise and after some weight loss, she was able to discontinue the metformin in 2018. She was working with an endocrinologist in [place], [place] and she felt great. In 2019 she moved back to [place] and started working as a . She was not able to exercise as much and she went on to contract COVID, twice. She had routine F/U in June of 2022 and at that time she was told that her A1C was 6.5% and that she had diabetes. She was restarted on metformin and given a prescription for simvastatin. She never started the statin and she went back on a strict diet and exercise plan. She has dropped 30 lbs and a repeat A1C in Dec had improved to 6%. She feels like she is doing well but she is overwhelmed by all of the information that she has found on the Internet. She would like help with her diet and she would like to start using a CGMS to monitor her blood sugars.    Endocrine relevant labs are as follows:   Latest Reference Range & Units 06/10/22 09:28   Hemoglobin A1C 0.0 - 5.6 % 6.5 (H)   (H): Data is abnormally high   Latest Reference Range & Units 09/19/22 09:05   Hemoglobin A1C 0.0 - 5.6 % 6.0 (H)   (H): Data is abnormally high   Latest Reference Range & Units 09/19/22 09:31   Albumin Urine mg/L mg/L <5      Latest Reference Range & Units 09/19/22 09:05   Cholesterol <200 mg/dL 184      Latest Reference Range & Units  09/19/22 09:05   LDL Cholesterol Calculated <=100 mg/dL 109 (H)   (H): Data is abnormally high   Latest Reference Range & Units 09/19/22 09:05   Triglycerides <150 mg/dL 150 (H)   (H): Data is abnormally high    REVIEW OF SYSTEMS  Answers for HPI/ROS submitted by the patient on 1/25/2023  General Symptoms: No  Skin Symptoms: No  HENT Symptoms: No  EYE SYMPTOMS: No  HEART SYMPTOMS: No  LUNG SYMPTOMS: No  INTESTINAL SYMPTOMS: Yes  URINARY SYMPTOMS: No  GYNECOLOGIC SYMPTOMS: No  BREAST SYMPTOMS: No  SKELETAL SYMPTOMS: Yes  BLOOD SYMPTOMS: No  NERVOUS SYSTEM SYMPTOMS: No  MENTAL HEALTH SYMPTOMS: No  Heart burn or indigestion: No  Nausea: No  Vomiting: No  Abdominal pain: Yes  Bloating: No  Constipation: No  Diarrhea: No  Blood in stool: No  Black stools: No  Rectal or Anal pain: No  Fecal incontinence: No  Yellowing of skin or eyes: No  Vomit with blood: No  Change in stools: No  Back pain: No  Muscle aches: Yes  Neck pain: No  Swollen joints: No  Joint pain: Yes  Bone pain: No  Muscle cramps: No  Muscle weakness: No  Joint stiffness: Yes  Bone fracture: No    Endocrine: positive for thyroid disorder and diabetes  Skin: positive for nail changes, negative for, hair loss on scalp, hyperpigmentation  Eyes: positive for glasses, negative for, visual blurring  Ears/Nose/Throat: negative  Respiratory: No shortness of breath, dyspnea on exertion, cough, or hemoptysis  Cardiovascular: positive for lower extremity edema, negative for, irregular heart beat and chest pain  Gastrointestinal: negative for, nausea, vomiting, constipation and diarrhea  Genitourinary: negative for, dysuria, frequency and urgency  Musculoskeletal: negative for, joint swelling, joint stiffness and muscular weakness  Neurologic: negative for, numbness or tingling of hands and numbness or tingling of feet  Psychiatric: negative for, anxiety and depression  Hematologic/Lymphatic/Immunologic: negative    Past Medical History  Past Medical History:  "  Diagnosis Date     Chest pain, unspecified 06/2006    neg stress test in [facility]     Diabetes mellitus, type 2 (H) 06/2019     Gestational diabetes mellitus, delivered 04/25/2008     Obesity, unspecified      Thyroid nodule 2018       Medications  Current Outpatient Medications   Medication Sig Dispense Refill     Cholecalciferol (VITAMIN D-3 PO) Take 1,000 Units by mouth daily       Continuous Blood Gluc Sensor (FREESTYLE THONY 2 SENSOR) MISC 1 each every 14 days 6 each 0     metFORMIN (GLUCOPHAGE XR) 500 MG 24 hr tablet TAKE 1 TAB DAILY IN AM FOR 1 WK THEN 2 TAB DAILY IN AM FOR 1 WK THEN 3 TAB DAILY IN AM FOR 1 WK THEN 4 TAB DAILY IN  tablet 0     simvastatin (ZOCOR) 20 MG tablet Take 1 tablet (20 mg) by mouth At Bedtime 90 tablet 3     triamcinolone (KENALOG) 0.1 % external cream Apply topically 2 times daily 30 g 3       Allergies  Allergies   Allergen Reactions     Sulfa Drugs Anaphylaxis     Dust Mites          Family History  family history includes Breast Cancer in her maternal aunt and maternal cousin; Breast Cancer (age of onset: 32) in her sister; Cancer - colorectal in her father; Cerebrovascular Disease in her mother; Cowden Syndrome in her brother and sister; Diabetes in her mother and other family members; Hypertension in her mother; Lipids in her mother; Prostate Cancer in her brother; Skin Cancer in her brother; Thyroid Cancer in her brother; Thyroid Disease in her brother and mother.    Social History  Social History     Tobacco Use     Smoking status: Never     Smokeless tobacco: Never   Vaping Use     Vaping Use: Never used   Substance Use Topics     Alcohol use: No     Drug use: No       Physical Exam  BP (!) 140/90   Pulse 96   Temp 97.8  F (36.6  C) (Oral)   Resp 16   Ht 1.619 m (5' 3.75\")   Wt 112.5 kg (248 lb)   LMP 01/25/2009   SpO2 96%   BMI 42.90 kg/m    Body mass index is 42.9 kg/m .  GENERAL :  In no apparent distress  SKIN: Normal color, normal temperature, texture.  No " hirsutism, alopecia or purple striae.     EYES: PERRL, EOMI, No scleral icterus,  No proptosis, conjunctival redness, stare, retraction  NECK: No visible masses. No palpable adenopathy, or masses. No carotid bruits.   THYROID:  Normal, nontender, smooth / firm texture,  multiple small nodules bilaterally, no Bruit   RESP: Lungs clear to auscultation bilaterally  CARDIAC: Regular rate and rhythm, normal S1 S2, without murmurs, rubs or gallops         NEURO: awake, alert, responds appropriately to questions.  Cranial nerves intact.   Moves all extremities; Gait normal.  No tremor of the outstretched hand.   EXTREMITIES: No clubbing or cyanosis. 2+ peripheral edema bilateral, varicosities present on lower extremities, fingernails are dry and ridged  FOOT EXAM: dryness throughout foot and callous formation at heels. Strong pedal pulses and sensation intact to monofilament testing.

## 2023-02-03 NOTE — TELEPHONE ENCOUNTER
PRIOR AUTHORIZATION DENIED    Medication: Axel 2 - Denied    Denial Date: 2/2/2023    Please switch to Dexcom if appropriate or we can appeal with reasoning why.    Denial Rational: Must try and fail Dexcom, still requires a PA.    Appeal Information:

## 2023-02-06 ENCOUNTER — TELEPHONE (OUTPATIENT)
Dept: ENDOCRINOLOGY | Facility: CLINIC | Age: 63
End: 2023-02-06
Payer: COMMERCIAL

## 2023-02-06 DIAGNOSIS — E11.9 TYPE 2 DIABETES MELLITUS WITHOUT COMPLICATION, WITHOUT LONG-TERM CURRENT USE OF INSULIN (H): Primary | ICD-10-CM

## 2023-02-06 NOTE — TELEPHONE ENCOUNTER
PA Initiation    Medication: Dexcom Transmitter & Sensors - PA Pending  Insurance Company: [employer] [place] - Phone [phone] Fax [phone]  Pharmacy Filling the Rx:    Filling Pharmacy Phone:    Filling Pharmacy Fax:    Start Date: 2/6/2023

## 2023-02-16 NOTE — TELEPHONE ENCOUNTER
Called [employer] and was notified that additional information was needed. I faxed chart notes to [phone].

## 2023-02-20 RX ORDER — BLOOD-GLUCOSE METER
EACH MISCELLANEOUS
Qty: 1 KIT | Refills: 0 | Status: SHIPPED | OUTPATIENT
Start: 2023-02-20

## 2023-02-20 RX ORDER — BLOOD SUGAR DIAGNOSTIC
STRIP MISCELLANEOUS
Qty: 300 STRIP | Refills: 0 | Status: SHIPPED | OUTPATIENT
Start: 2023-02-20

## 2023-02-20 NOTE — TELEPHONE ENCOUNTER
PRIOR AUTHORIZATION DENIED    Medication: Dexcom Transmitter & Sensors - PA Denied     Denial Date: 2/19/2023    Denial Rational:     Appeal Information:

## 2023-02-21 NOTE — TELEPHONE ENCOUNTER
"I called LM for the pt to c/b to inform that the PA was denied and per the prescriber, \" I just sent in a new prescription for OneTouch Verio glucometer and test strips. She can use fingerstick testing 3x daily to monitor her blood sugars. She also has the option to pay out of pocket for the Axel 2. The most important thing is that she is monitoring her blood sugars\".   "

## 2023-02-23 ENCOUNTER — MYC MEDICAL ADVICE (OUTPATIENT)
Dept: ENDOCRINOLOGY | Facility: CLINIC | Age: 63
End: 2023-02-23
Payer: COMMERCIAL

## 2023-02-28 ENCOUNTER — ALLIED HEALTH/NURSE VISIT (OUTPATIENT)
Dept: EDUCATION SERVICES | Facility: CLINIC | Age: 63
End: 2023-02-28
Payer: COMMERCIAL

## 2023-02-28 VITALS — WEIGHT: 247.6 LBS | BODY MASS INDEX: 42.83 KG/M2

## 2023-02-28 DIAGNOSIS — E11.9 TYPE 2 DIABETES MELLITUS WITHOUT COMPLICATION, WITHOUT LONG-TERM CURRENT USE OF INSULIN (H): ICD-10-CM

## 2023-02-28 PROCEDURE — 99207 PR DROP WITH A PROCEDURE: CPT

## 2023-02-28 PROCEDURE — G0108 DIAB MANAGE TRN  PER INDIV: HCPCS

## 2023-02-28 RX ORDER — LANCETS 33 GAUGE
1 EACH MISCELLANEOUS DAILY
Qty: 100 EACH | Refills: 11 | Status: SHIPPED | OUTPATIENT
Start: 2023-02-28 | End: 2024-06-25

## 2023-02-28 NOTE — PROGRESS NOTES
Diabetes Self-Management Education & Support    Presents for: Initial Assessment for new diagnosis    Type of Service: In Person Visit    Assessment Type:   CGM Initial Settings: Freestyle Axel  Freestyle Axel Target Glucose Range (mg/dL):     Sensor was inserted with no resistance or bleeding at insertion site.    CGM-specific education:   Freestyle Axel sensor: insertion technique, sensor site location and rotation, sensor wear, reasons to remove sensor (MRI, CT, diathermy), Vitamin C & Aspirin effects on sensor, phone [place]: frequency of scanning sensor, length of time data is visible, Use of trends and graphs for pattern management and problem solving and [employer] software         ASSESSMENT:  Patient is motivated to make lifestyle changes and lose weight to manage her diabetes. She is also interested in a personal CGM to better understand her glucose patterns and what affects food have on her diabetes. 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     Discussed basic pathophysiology of diabetes. Reviewed the plate planner and portion control, the benefit of spreading intake throughout the day.  Encouraged 3 meals a day and small snacks between meals if hungry, adding fruits and vegetables to meals and snacks. Reviewed benefits of exercise to help with better blood glucose utilization, and weight loss. In review of her BG log, her glucose is in goal. Discussed available CGM's on the market and the cost of each device. She is aware that her insurance does not cover CGM under her current plan. Shewould like to go forward with the Axel 2 sensor. She already downloaded the matthias and I was able to provide her with a starter kit and instruction in clinic today.     Pt verbalized understanding of concepts discussed and recommendations provided today.    PLAN  Meal Plan Recommendation: eat 3 meals a day, have small  snacks between meals, if needed, use portion control, and use plate planning method  Exercise / activity plan: try adding 10-15 minutes of daily activity- use a smart phone matthias or walk   Glucose monitoring: use the sage sensor. OR test your blood sugar with your meter, test daily- before or 2 hours after the start of a meal  Blood sugar goals:  Before meals:   2 hours after the start of a meal: less than 180    Follow up:  Follow-up diabetes education appointment scheduled on 3/21/23 at 2:30pm.     Topics to cover at upcoming visits: Healthy Eating, Taking Medication, Problem Solving, Reducing Risks and Healthy Coping    See Care Plan for co-developed, patient-state behavior change goals.  AVS provided for patient today.    Education Materials Provided:  [email or web address] Understanding Diabetes Booklet, My Plate Planner and snack idea and Sage 2 sensor      SUBJECTIVE/OBJECTIVE:  Presents for: Initial Assessment for new diagnosis  Accompanied by: Self  Diabetes education in the past 24mo: No  Focus of Visit: CGM, Assistance w/ making life changes  Type of CGM visit: Personal CGM Start  Diabetes type: Type 2  Disease course: Stable  How confident are you filling out medical forms by yourself:: Extremely  Diabetes management related comments/concerns: yes  Transportation concerns: No  Difficulty affording diabetes medication?: No  Difficulty affording diabetes testing supplies?: No  Other concerns:: None  Cultural Influences/Ethnic Background:  Not  or     Diabetes Symptoms & Complications:  Fatigue: No  Neuropathy: No  Polydipsia: Yes  Polyphagia: No  Polyuria: Sometimes  Visual change: No  Slow healing wounds: Sometimes  Complications assessed today?: Yes  Autonomic neuropathy: No  CVA: No  Heart disease: No  Nephropathy: No  Peripheral neuropathy: No  Peripheral Vascular Disease: No  Retinopathy: No  Sexual dysfunction: No    Patient Problem List and Family Medical History reviewed for  "relevant medical history, current medical status, and diabetes risk factors.    Vitals:  Wt 112.3 kg (247 lb 9.6 oz)   LMP 01/25/2009   BMI 42.83 kg/m    Estimated body mass index is 42.83 kg/m  as calculated from the following:    Height as of 1/31/23: 1.619 m (5' 3.75\").    Weight as of this encounter: 112.3 kg (247 lb 9.6 oz).   Last 3 BP:   BP Readings from Last 3 Encounters:   01/31/23 (!) 140/90   06/17/22 114/78   03/19/21 126/78       History   Smoking Status     Never   Smokeless Tobacco     Never       Labs:  Lab Results   Component Value Date    A1C 6.0 01/31/2023    A1C 5.5 03/03/2017     Lab Results   Component Value Date     01/31/2023     06/10/2022     03/19/2021     Lab Results   Component Value Date     01/31/2023     03/19/2021     HDL Cholesterol   Date Value Ref Range Status   03/19/2021 52 >49 mg/dL Final     Direct Measure HDL   Date Value Ref Range Status   01/31/2023 54 >=50 mg/dL Final   ]  GFR Estimate   Date Value Ref Range Status   01/31/2023 79 >60 mL/min/1.73m2 Final     Comment:     eGFR calculated using 2021 CKD-EPI equation.   08/18/2020 75 >60 mL/min/[1.73_m2] Final     Comment:     Non  GFR Calc  Starting 12/18/2018, serum creatinine based estimated GFR (eGFR) will be   calculated using the Chronic Kidney Disease Epidemiology Collaboration   (CKD-EPI) equation.       GFR Estimate If Black   Date Value Ref Range Status   08/18/2020 87 >60 mL/min/[1.73_m2] Final     Comment:      GFR Calc  Starting 12/18/2018, serum creatinine based estimated GFR (eGFR) will be   calculated using the Chronic Kidney Disease Epidemiology Collaboration   (CKD-EPI) equation.       Lab Results   Component Value Date    CR 0.82 01/31/2023    CR 0.84 08/18/2020     No results found for: MICROALBUMIN    Healthy Eating:  Healthy Eating Assessed Today: Yes  Cultural/Judaism diet restrictions?: No  Meal planning/habits: Keeps food " records  How many times a week on average do you eat food made away from home (restaurant/take-out)?: 1  Meals include: Breakfast, Lunch, Dinner, Morning Snack, Afternoon Snack  Breakfast: coffee-nut pod creamer- flaxseed muffin OR egg(ham, spinach,egg), 1 toast, 1/2 avacado OR 2good yogurt  Lunch: spring greens, tomatoes, cheese, ranch dressing ( 2 TBSP) sumo orange OR tuna, celery, carrots, red peppers, pumpkin seeds, beef sticks, fruit  Dinner: protein, broccoli/brussel sprouts/cauliflower, sweet potato OR meatballs, spaghetti squash, salad  Snacks: 2good yogurt, fruit, kind cereal  Beverages: Water, Tea, Coffee    Being Active:  Being Active Assessed Today: Yes  Exercise:: Currently not exercising  Barrier to exercise: Time    Monitoring:  Monitoring Assessed Today: Yes  Did patient bring glucose meter to appointment? : Yes  Blood Glucose Meter: One Touch  Times checking blood sugar at home (number): 3  Times checking blood sugar at home (per): Day  Blood glucose trend: Fluctuating                  Taking Medications:  Diabetes Medication(s)     Biguanides       metFORMIN (GLUCOPHAGE XR) 500 MG 24 hr tablet    TAKE 1 TAB DAILY IN AM FOR 1 WK THEN 2 TAB DAILY IN AM FOR 1 WK THEN 3 TAB DAILY IN AM FOR 1 WK THEN 4 TAB DAILY IN AM          Taking Medication Assessed Today: Yes  Current Treatments: Diet, Oral Medication (taken by mouth)  Problems taking diabetes medications regularly?: No  Diabetes medication side effects?: No    Problem Solving:  Problem Solving Assessed Today: Yes  Is the patient at risk for hypoglycemia?: No              Reducing Risks:  Reducing Risks Assessed Today: Yes  Diabetes Risks: Age over 45 years, Sedentary Lifestyle, Family History, History of gestational diabetes  Additional female risks: Gestational Diabetes  CAD Risks: Diabetes Mellitus, Dyslipidemia, Obesity, Sedentary lifestyle, Post-menopausal  Has dilated eye exam at least once a year?: Yes  Sees dentist every 6 months?:  Yes  Feet checked by healthcare provider in the last year?: Yes    Healthy Coping:  Healthy Coping Assessed Today: Yes  Emotional response to diabetes: Ready to learn  Informal Support system:: Children, Family, Friends, Spouse  Stage of change: PREPARATION (Decided to change - considering how)  Patient Activation Measure Survey Score:  RAJAN Score (Last Two) 1/25/2011 8/21/2020   RAJAN Raw Score 42 37   Activation Score 66 79.2   RAJAN Level 3 4         Care Plan and Education Provided:  Care Plan: Diabetes   Updates made by Norma Feliciano RN since 2/28/2023 12:00 AM      Problem: HbA1C Not In Goal       Goal: Establish Regular Follow-Ups with PCP       Task: Discuss with PCP the recommended timing for patient's next follow up visit(s)    Responsible User: Norma Feliciano RN      Task: Discuss schedule for PCP visits with patient    Responsible User: Norma Feliciano RN      Goal: Get HbA1C Level in Goal       Task: Educate patient on diabetes education self-management topics    Responsible User: Norma Feliciano RN      Task: Educate patient on benefits of regular glucose monitoring    Responsible User: Norma Feliciano RN      Task: Refer patient to appropriate extended care team member, as needed (Medication Therapy Management, Behavioral Health, Physical Therapy, etc.)    Responsible User: Norma Felicinao RN      Task: Discuss diabetes treatment plan with patient    Responsible User: Norma Feliciano RN      Problem: Diabetes Self-Management Education Needed to Optimize Self-Care Behaviors       Goal: Understand diabetes pathophysiology and disease progression       Task: Provide education on diabetes pathophysiology and disease progression specfic to patient's diabetes type Completed 2/28/2023   Responsible User: Norma Feliciano RN      Goal: Healthy Eating - follow a healthy eating pattern for diabetes       Task: Provide education on portion control and consistency in amount, composition and timing of  food intake    Responsible User: Norma Feliciano RN      Task: Provide education on managing carbohydrate intake (carbohydrate counting, plate planning method, etc.) Completed 2/28/2023   Responsible User: Norma Feliciano RN      Task: Provide education on weight management    Responsible User: Norma Feliciano RN      Task: Provide education on heart healthy eating    Responsible User: Norma Feliciano RN      Task: Provide education on eating out    Responsible User: Norma Feliciano RN      Task: Develop individualized healthy eating plan with patient    Responsible User: Norma Feliciano RN      Goal: Being Active - get regular physical activity, working up to at least 150 minutes per week    Note:    My Goal: I will add 10-15 minutes of daily activity    What I need to meet my goal: walk or use a smartphone matthias    I plan to meet my goal by this date: 3/21/23       Task: Provide education on relationship of activity to glucose and precautions to take if at risk for low glucose Completed 2/28/2023   Responsible User: Norma Feliciano RN      Task: Discuss barriers to physical activity with patient Completed 2/28/2023   Responsible User: Norma Feliciano RN      Task: Develop physical activity plan with patient Completed 2/28/2023   Responsible User: Norma Feliciano RN      Task: Explore community resources including walking groups, assistance programs, and home videos    Responsible User: Norma Feliciano RN      Goal: Monitoring - monitor glucose and ketones as directed       Task: Provide education on blood glucose monitoring (purpose, proper technique, frequency, glucose targets, interpreting results, when to use glucose control solution, sharps disposal)    Responsible User: Norma Feliciano RN      Task: Provide education on continuous glucose monitoring (sensor placement, use of matthias or /reader, understanding glucose trends, alerts and alarms, differences between sensor glucose and  blood glucose) Completed 2/28/2023   Responsible User: Norma Feliciano RN      Task: Provide education on ketone monitoring (when to monitor, frequency, etc.)    Responsible User: Norma Feliciano RN      Goal: Taking Medication - patient is consistently taking medications as directed       Task: Provide education on action of prescribed medication, including when to take and possible side effects    Responsible User: Norma Feliciano RN      Task: Provide education on insulin and injectable diabetes medications, including administration, storage, site selection and rotation for injection sites    Responsible User: Norma Feliciano RN      Task: Discuss barriers to medication adherence with patient and provide management technique ideas as appropriate    Responsible User: Norma Feliciano RN      Task: Provide education on frequency and refill details of medications    Responsible User: Norma Feliciano RN      Goal: Problem Solving - know how to prevent and manage short-term diabetes complications       Task: Provide education on high blood glucose - causes, signs/symptoms, prevention and treatment    Responsible User: Norma Feliciano RN      Task: Provide education on low blood glucose - causes, signs/symptoms, prevention, treatment, carrying a carbohydrate source at all times, and medical identification    Responsible User: Norma Feliciano RN      Task: Provide education on safe travel with diabetes    Responsible User: Norma Feliciano RN      Task: Provide education on how to care for diabetes on sick days    Responsible User: Norma Feliciano RN      Task: Provide education on when to call a health care provider    Responsible User: Norma Feliciano RN      Goal: Reducing Risks - know how to prevent and treat long-term diabetes complications       Task: Provide education on major complications of diabetes, prevention, early diagnostic measures and treatment of complications    Responsible User:  Norma Feliciano RN      Task: Provide education on recommended care for dental, eye and foot health    Responsible User: Norma Feliciano RN      Task: Provide education on Hemoglobin A1c - goals and relationship to blood glucose levels Completed 2/28/2023   Responsible User: Norma Feliciano RN      Task: Provide education on recommendations for heart health - lipid levels and goals, blood pressure and goals, and aspirin therapy, if indicated    Responsible User: Norma Feliciano RN      Task: Provide education on tobacco cessation    Responsible User: Norma Feliciano RN      Goal: Healthy Coping - use available resources to cope with the challenges of managing diabetes       Task: Discuss recognizing feelings about having diabetes    Responsible User: Norma Feliciano RN      Task: Provide education on the benefits of making appropriate lifestyle changes    Responsible User: Norma Feliciano RN      Task: Provide education on benefits of utilizing support systems    Responsible User: Norma Feliciano RN      Task: Discuss methods for coping with stress    Responsible User: Norma Feliciano RN      Task: Provide education on when to seek professional counseling    Responsible User: Norma Feliciano RN Vickie Baeyen BSERNST, RN, PHN, [facility]     Time Spent: 70 minutes  Encounter Type: Individual    Any diabetes medication dose changes were made via the CDE Protocol per the patient's referring provider. A copy of this encounter was shared with the provider.

## 2023-02-28 NOTE — PATIENT INSTRUCTIONS
Care Plan:  Meal Plan Recommendation: eat 3 meals a day, have small snacks between meals, if needed, use portion control, and use plate planning method  Exercise / activity plan: try adding 10-15 minutes of daily activity- use a smart phone matthias or walk   Glucose monitoring: use the sage sensor. OR test your blood sugar with your meter, test daily- before or 2 hours after the start of a meal  Blood sugar goals:  Before meals:   2 hours after the start of a meal: less than 180    Follow up:  Follow-up diabetes education appointment scheduled on 3/21/23 at 2:30pm.      Bring blood glucose meter and logbook with you to all doctor and follow-up appointments.       Sage 2  sensor Instructions:  1. The first hour after you place the sensor is the warm up period (black out period).  You will need to carry your blood glucose meter and check blood glucose during this time.    2. Check blood sugar if feeling symptoms of hypoglycemia but meter is not displaying a low number- may be some variability between sensor and meter at times.    3. Change sensor every 14 days.    4. Read/scan blood sugars every 8 hours to ensure entirety of data is available.  Tip: Try to scan minimum before each meal and bed.    5. Watch trend arrows- will let you know if blood sugars are rising, falling or stable at the time you check.    6. Sage 2 has alarms. You can adjust both the high and low blood glucose alarm (when they will go off) or turn off high blood glucose alarm if alarming too much.  You cannot turn off the critical low blood glucose alarm.    7. It is okay to shower, bathe, and swim (up to 3 feet deep for 30 minutes) with sensor. Do not get reader wet.    8. Remove the sensor if you need to have an MRI or CT scan.    9. Do not cover the sensor with extra adhesive (the small hole in the center of the sensor must remain uncovered).  If you have trouble with sensor falling off, these are approved products to help with sensor  adhesion: Torbot Skin Tac, SKIN-PREP Protective Barrier Wipe and Mastisol Liquid Adhesive    10. Check the dose if you take a multivitamin or Vitamin C (ascorbic acid). You want to limit daily intake of ascorbic acid to 500 mg/day or less, or it may falsely raise sensor glucose readings. This is more of a concern if you are on insulin or medication that can cause low blood glucose as you may miss a severe low glucose event.    Support:   If you have any trouble with use or if the sensor falls off early, call the customer service number for Axel located on the sensor's box. They can often help troubleshoot or replace sensor if needed.  Axel Customer Service: [phone]      Discount Programs:  [facility] ([email or web address])    To save on sensors, if told cost is more than $75: call Mi[employer] line at [phone]     Software:    youcalc (www.LibreView.com)    Apps:  Axel 2 (Lets you scan blood glucose with your phone)  [employer] Hub (Lets you share blood glucose with others)    [place] [facility] for the [facility] Area:  For Your Diabetes Education and Nutrition Appointments Call:  [phone]   For Diabetes Education or Nutrition Related Questions:   Phone: [phone]  Send Adocia Message   If you need a medication refill please contact your pharmacy. Please allow 3 business days for your refills to be completed.

## 2023-02-28 NOTE — LETTER
2/28/2023         RE: Vani Daniel  [address]  [facility] [address]        Dear Colleague,    Thank you for referring your patient, Vani Daniel, to the [facility]. Please see a copy of my visit note below.    Diabetes Self-Management Education & Support    Presents for: Initial Assessment for new diagnosis    Type of Service: In Person Visit    Assessment Type:   CGM Initial Settings: Freestyle Axel  Freestyle Axel Target Glucose Range (mg/dL):     Sensor was inserted with no resistance or bleeding at insertion site.    CGM-specific education:   Freestyle Axel sensor: insertion technique, sensor site location and rotation, sensor wear, reasons to remove sensor (MRI, CT, diathermy), Vitamin C & Aspirin effects on sensor, phone [place]: frequency of scanning sensor, length of time data is visible, Use of trends and graphs for pattern management and problem solving and Soundstache software         ASSESSMENT:  Patient is motivated to make lifestyle changes and lose weight to manage her diabetes. She is also interested in a personal CGM to better understand her glucose patterns and what affects food have on her diabetes. 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     Discussed basic pathophysiology of diabetes. Reviewed the plate planner and portion control, the benefit of spreading intake throughout the day.  Encouraged 3 meals a day and small snacks between meals if hungry, adding fruits and vegetables to meals and snacks. Reviewed benefits of exercise to help with better blood glucose utilization, and weight loss. In review of her BG log, her glucose is in goal. Discussed available CGM's on the market and the cost of each device. She is aware that her insurance does not cover CGM under her current plan. Shewould like to go forward with the Axel 2 sensor.  She already downloaded the matthias and I was able to provide her with a starter kit and instruction in clinic today.     Pt verbalized understanding of concepts discussed and recommendations provided today.    PLAN  Meal Plan Recommendation: eat 3 meals a day, have small snacks between meals, if needed, use portion control, and use plate planning method  Exercise / activity plan: try adding 10-15 minutes of daily activity- use a smart phone matthias or walk   Glucose monitoring: use the sage sensor. OR test your blood sugar with your meter, test daily- before or 2 hours after the start of a meal  Blood sugar goals:  Before meals:   2 hours after the start of a meal: less than 180    Follow up:  Follow-up diabetes education appointment scheduled on 3/21/23 at 2:30pm.     Topics to cover at upcoming visits: Healthy Eating, Taking Medication, Problem Solving, Reducing Risks and Healthy Coping    See Care Plan for co-developed, patient-state behavior change goals.  AVS provided for patient today.    Education Materials Provided:  [employer] Understanding Diabetes Booklet, My Plate Planner and snack idea and Sage 2 sensor      SUBJECTIVE/OBJECTIVE:  Presents for: Initial Assessment for new diagnosis  Accompanied by: Self  Diabetes education in the past 24mo: No  Focus of Visit: CGM, Assistance w/ making life changes  Type of CGM visit: Personal CGM Start  Diabetes type: Type 2  Disease course: Stable  How confident are you filling out medical forms by yourself:: Extremely  Diabetes management related comments/concerns: yes  Transportation concerns: No  Difficulty affording diabetes medication?: No  Difficulty affording diabetes testing supplies?: No  Other concerns:: None  Cultural Influences/Ethnic Background:  Not  or     Diabetes Symptoms & Complications:  Fatigue: No  Neuropathy: No  Polydipsia: Yes  Polyphagia: No  Polyuria: Sometimes  Visual change: No  Slow healing wounds:  "Sometimes  Complications assessed today?: Yes  Autonomic neuropathy: No  CVA: No  Heart disease: No  Nephropathy: No  Peripheral neuropathy: No  Peripheral Vascular Disease: No  Retinopathy: No  Sexual dysfunction: No    Patient Problem List and Family Medical History reviewed for relevant medical history, current medical status, and diabetes risk factors.    Vitals:  Wt 112.3 kg (247 lb 9.6 oz)   LMP 01/25/2009   BMI 42.83 kg/m    Estimated body mass index is 42.83 kg/m  as calculated from the following:    Height as of 1/31/23: 1.619 m (5' 3.75\").    Weight as of this encounter: 112.3 kg (247 lb 9.6 oz).   Last 3 BP:   BP Readings from Last 3 Encounters:   01/31/23 (!) 140/90   06/17/22 114/78   03/19/21 126/78       History   Smoking Status     Never   Smokeless Tobacco     Never       Labs:  Lab Results   Component Value Date    A1C 6.0 01/31/2023    A1C 5.5 03/03/2017     Lab Results   Component Value Date     01/31/2023     06/10/2022     03/19/2021     Lab Results   Component Value Date     01/31/2023     03/19/2021     HDL Cholesterol   Date Value Ref Range Status   03/19/2021 52 >49 mg/dL Final     Direct Measure HDL   Date Value Ref Range Status   01/31/2023 54 >=50 mg/dL Final   ]  GFR Estimate   Date Value Ref Range Status   01/31/2023 79 >60 mL/min/1.73m2 Final     Comment:     eGFR calculated using 2021 CKD-EPI equation.   08/18/2020 75 >60 mL/min/[1.73_m2] Final     Comment:     Non  GFR Calc  Starting 12/18/2018, serum creatinine based estimated GFR (eGFR) will be   calculated using the Chronic Kidney Disease Epidemiology Collaboration   (CKD-EPI) equation.       GFR Estimate If Black   Date Value Ref Range Status   08/18/2020 87 >60 mL/min/[1.73_m2] Final     Comment:      GFR Calc  Starting 12/18/2018, serum creatinine based estimated GFR (eGFR) will be   calculated using the Chronic Kidney Disease Epidemiology Collaboration "   (CKD-EPI) equation.       Lab Results   Component Value Date    CR 0.82 01/31/2023    CR 0.84 08/18/2020     No results found for: MICROALBUMIN    Healthy Eating:  Healthy Eating Assessed Today: Yes  Cultural/Voodoo diet restrictions?: No  Meal planning/habits: Keeps food records  How many times a week on average do you eat food made away from home (restaurant/take-out)?: 1  Meals include: Breakfast, Lunch, Dinner, Morning Snack, Afternoon Snack  Breakfast: coffee-nut pod creamer- flaxseed muffin OR egg(ham, spinach,egg), 1 toast, 1/2 avacado OR 2good yogurt  Lunch: spring greens, tomatoes, cheese, ranch dressing ( 2 TBSP) sumo orange OR tuna, celery, carrots, red peppers, pumpkin seeds, beef sticks, fruit  Dinner: protein, broccoli/brussel sprouts/cauliflower, sweet potato OR meatballs, spaghetti squash, salad  Snacks: 2good yogurt, fruit, kind cereal  Beverages: Water, Tea, Coffee    Being Active:  Being Active Assessed Today: Yes  Exercise:: Currently not exercising  Barrier to exercise: Time    Monitoring:  Monitoring Assessed Today: Yes  Did patient bring glucose meter to appointment? : Yes  Blood Glucose Meter: One Touch  Times checking blood sugar at home (number): 3  Times checking blood sugar at home (per): Day  Blood glucose trend: Fluctuating                  Taking Medications:  Diabetes Medication(s)     Biguanides       metFORMIN (GLUCOPHAGE XR) 500 MG 24 hr tablet    TAKE 1 TAB DAILY IN AM FOR 1 WK THEN 2 TAB DAILY IN AM FOR 1 WK THEN 3 TAB DAILY IN AM FOR 1 WK THEN 4 TAB DAILY IN AM          Taking Medication Assessed Today: Yes  Current Treatments: Diet, Oral Medication (taken by mouth)  Problems taking diabetes medications regularly?: No  Diabetes medication side effects?: No    Problem Solving:  Problem Solving Assessed Today: Yes  Is the patient at risk for hypoglycemia?: No              Reducing Risks:  Reducing Risks Assessed Today: Yes  Diabetes Risks: Age over 45 years, Sedentary  Lifestyle, Family History, History of gestational diabetes  Additional female risks: Gestational Diabetes  CAD Risks: Diabetes Mellitus, Dyslipidemia, Obesity, Sedentary lifestyle, Post-menopausal  Has dilated eye exam at least once a year?: Yes  Sees dentist every 6 months?: Yes  Feet checked by healthcare provider in the last year?: Yes    Healthy Coping:  Healthy Coping Assessed Today: Yes  Emotional response to diabetes: Ready to learn  Informal Support system:: Children, Family, Friends, Spouse  Stage of change: PREPARATION (Decided to change - considering how)  Patient Activation Measure Survey Score:  RAJAN Score (Last Two) 1/25/2011 8/21/2020   RAJAN Raw Score 42 37   Activation Score 66 79.2   RAJAN Level 3 4         Care Plan and Education Provided:  Care Plan: Diabetes   Updates made by Norma Feliciano RN since 2/28/2023 12:00 AM      Problem: HbA1C Not In Goal       Goal: Establish Regular Follow-Ups with PCP       Task: Discuss with PCP the recommended timing for patient's next follow up visit(s)    Responsible User: Norma Feliciano RN      Task: Discuss schedule for PCP visits with patient    Responsible User: Norma Feliciano RN      Goal: Get HbA1C Level in Goal       Task: Educate patient on diabetes education self-management topics    Responsible User: Norma Feliciano RN      Task: Educate patient on benefits of regular glucose monitoring    Responsible User: Norma Feliciano RN      Task: Refer patient to appropriate extended care team member, as needed (Medication Therapy Management, Behavioral Health, Physical Therapy, etc.)    Responsible User: Norma Feliciano RN      Task: Discuss diabetes treatment plan with patient    Responsible User: Norma Feliciano RN      Problem: Diabetes Self-Management Education Needed to Optimize Self-Care Behaviors       Goal: Understand diabetes pathophysiology and disease progression       Task: Provide education on diabetes pathophysiology and disease  progression specfic to patient's diabetes type Completed 2/28/2023   Responsible User: Norma Feliciano RN      Goal: Healthy Eating - follow a healthy eating pattern for diabetes       Task: Provide education on portion control and consistency in amount, composition and timing of food intake    Responsible User: Norma Feliciano RN      Task: Provide education on managing carbohydrate intake (carbohydrate counting, plate planning method, etc.) Completed 2/28/2023   Responsible User: Norma Feliciano RN      Task: Provide education on weight management    Responsible User: Norma Feliciano RN      Task: Provide education on heart healthy eating    Responsible User: Norma Feliciano RN      Task: Provide education on eating out    Responsible User: Norma Feliciano RN      Task: Develop individualized healthy eating plan with patient    Responsible User: Norma Feliciano RN      Goal: Being Active - get regular physical activity, working up to at least 150 minutes per week    Note:    My Goal: I will add 10-15 minutes of daily activity    What I need to meet my goal: walk or use a smartphone matthias    I plan to meet my goal by this date: 3/21/23       Task: Provide education on relationship of activity to glucose and precautions to take if at risk for low glucose Completed 2/28/2023   Responsible User: Norma Feliciano RN      Task: Discuss barriers to physical activity with patient Completed 2/28/2023   Responsible User: Norma Feliciano RN      Task: Develop physical activity plan with patient Completed 2/28/2023   Responsible User: Norma Feliciano RN      Task: Explore community resources including walking groups, assistance programs, and home videos    Responsible User: Norma Feliciano RN      Goal: Monitoring - monitor glucose and ketones as directed       Task: Provide education on blood glucose monitoring (purpose, proper technique, frequency, glucose targets, interpreting results, when to use  glucose control solution, sharps disposal)    Responsible User: Norma Feliciano RN      Task: Provide education on continuous glucose monitoring (sensor placement, use of matthias or /reader, understanding glucose trends, alerts and alarms, differences between sensor glucose and blood glucose) Completed 2/28/2023   Responsible User: Norma Feliciano RN      Task: Provide education on ketone monitoring (when to monitor, frequency, etc.)    Responsible User: Norma Feliciano RN      Goal: Taking Medication - patient is consistently taking medications as directed       Task: Provide education on action of prescribed medication, including when to take and possible side effects    Responsible User: Norma Feliciano RN      Task: Provide education on insulin and injectable diabetes medications, including administration, storage, site selection and rotation for injection sites    Responsible User: Norma Feliciano RN      Task: Discuss barriers to medication adherence with patient and provide management technique ideas as appropriate    Responsible User: Norma Feliciano RN      Task: Provide education on frequency and refill details of medications    Responsible User: Norma Feliciano RN      Goal: Problem Solving - know how to prevent and manage short-term diabetes complications       Task: Provide education on high blood glucose - causes, signs/symptoms, prevention and treatment    Responsible User: Norma Feliciano RN      Task: Provide education on low blood glucose - causes, signs/symptoms, prevention, treatment, carrying a carbohydrate source at all times, and medical identification    Responsible User: Norma Feliciano RN      Task: Provide education on safe travel with diabetes    Responsible User: Norma Feliciano RN      Task: Provide education on how to care for diabetes on sick days    Responsible User: Norma Feliciano RN      Task: Provide education on when to call a health care provider     Responsible User: Norma Feliciano RN      Goal: Reducing Risks - know how to prevent and treat long-term diabetes complications       Task: Provide education on major complications of diabetes, prevention, early diagnostic measures and treatment of complications    Responsible User: Norma Feliciano RN      Task: Provide education on recommended care for dental, eye and foot health    Responsible User: Norma Feliciano RN      Task: Provide education on Hemoglobin A1c - goals and relationship to blood glucose levels Completed 2/28/2023   Responsible User: Norma Feliciano RN      Task: Provide education on recommendations for heart health - lipid levels and goals, blood pressure and goals, and aspirin therapy, if indicated    Responsible User: Norma Feliciano RN      Task: Provide education on tobacco cessation    Responsible User: Norma Feliciano RN      Goal: Healthy Coping - use available resources to cope with the challenges of managing diabetes       Task: Discuss recognizing feelings about having diabetes    Responsible User: Norma Feliciano RN      Task: Provide education on the benefits of making appropriate lifestyle changes    Responsible User: Norma Feliciano RN      Task: Provide education on benefits of utilizing support systems    Responsible User: Norma Feliciano RN      Task: Discuss methods for coping with stress    Responsible User: Norma Feliciano RN      Task: Provide education on when to seek professional counseling    Responsible User: Norma Feliciano RN Vickie Baeyen BSN, RN, PHN, [facility]     Time Spent: 70 minutes  Encounter Type: Individual    Any diabetes medication dose changes were made via the CDE Protocol per the patient's referring provider. A copy of this encounter was shared with the provider.

## 2023-03-04 DIAGNOSIS — E11.9 TYPE 2 DIABETES MELLITUS WITHOUT COMPLICATION, WITHOUT LONG-TERM CURRENT USE OF INSULIN (H): ICD-10-CM

## 2023-03-06 RX ORDER — METFORMIN HCL 500 MG
2000 TABLET, EXTENDED RELEASE 24 HR ORAL
Qty: 360 TABLET | Refills: 0 | Status: SHIPPED | OUTPATIENT
Start: 2023-03-06 | End: 2023-03-31

## 2023-03-06 NOTE — TELEPHONE ENCOUNTER
Routing refill request to provider for review/approval because:  Pt has already tapered onto rx, please see current sig (see sig below)  Sig: TAKE 1 TAB DAILY IN AM FOR 1 WEEK THEN 2 TAB DAILY IN AM FOR 1 WEEK THEN 3 TAB DAILY IN AM FOR 1 WEEK THEN 4 TAB DAILY IN AM    Praveena Buck RN

## 2023-03-10 ENCOUNTER — TRANSFERRED RECORDS (OUTPATIENT)
Dept: HEALTH INFORMATION MANAGEMENT | Facility: CLINIC | Age: 63
End: 2023-03-10
Payer: COMMERCIAL

## 2023-03-21 ENCOUNTER — ALLIED HEALTH/NURSE VISIT (OUTPATIENT)
Dept: EDUCATION SERVICES | Facility: CLINIC | Age: 63
End: 2023-03-21
Payer: COMMERCIAL

## 2023-03-21 VITALS — BODY MASS INDEX: 42.45 KG/M2 | WEIGHT: 245.4 LBS

## 2023-03-21 DIAGNOSIS — E11.9 TYPE 2 DIABETES MELLITUS WITHOUT COMPLICATION, WITHOUT LONG-TERM CURRENT USE OF INSULIN (H): Primary | ICD-10-CM

## 2023-03-21 PROCEDURE — G0108 DIAB MANAGE TRN  PER INDIV: HCPCS

## 2023-03-21 PROCEDURE — 99207 PR DROP WITH A PROCEDURE: CPT

## 2023-03-21 NOTE — LETTER
3/21/2023         RE: Vani Daniel  [address]  [facility] [address]        Dear Colleague,    Thank you for referring your patient, Vani Daniel, to the [facility]. Please see a copy of my visit note below.    Diabetes Self-Management Education & Support    Presents for: Follow-up    Type of Service: In Person Visit    Assessment Type:   REPORTS:              Pt verbalized understanding of concepts discussed and recommendations provided today.         ASSESSMENT:  Review of CGM report. Glucose overall average 105mg/dl,  in target 99%, above target 0% and below target 1% of the time.     Patient is doing well with diet and activity. She has lost 2 more pounds. She is feeling successful in the changes she is making and feels it is sustainable. She request further guidance on carbohydrate counting today.     Reviewed carbohydrate sources and carbohydrate counting, portion control, meal plan ideas and snacks.  Reviewed the benefits of exercise to help with better blood glucose utilization. Discussed risks and complications of diabetes, including prevention and monitoring.       PLAN  Meal Plan Recommendation: eat 3 meals a day, have small snacks between meals, if needed, use portion control, and use plate planning method.   Aim for 30-45 grams of carbohydrates at meals, and 15 grams of carbohydrates at snacks.   Exercise / activity plan: continue with your activity, goal of at least 150 minutes per week.     Glucose monitoring: use the sage sensor. OR test your blood sugar with your meter, test daily- before or 2 hours after the start of a meal    Blood sugar goals:  Before meals:   2 hours after the start of a meal: less than 180    Discount Programs:  MyFreestyle Program (https://www.[employer].abbott/us-en/myfreestyle.html)  To save on sensors, if told cost is more than $75: call [employer] Voucher line at [phone]      Software:    [employer]  "(www.ElectroCore)     Follow up:  Follow-up diabetes yearly or sooner if needed.     See Care Plan for co-developed, patient-state behavior change goals.  AVS provided for patient today.    Education Materials Provided:  No new materials provided today      SUBJECTIVE/OBJECTIVE:  Presents for: Follow-up  Accompanied by: Self  Diabetes education in the past 24mo: No  Focus of Visit: CGM, Assistance w/ making life changes  Type of CGM visit: Personal CGM Start  Diabetes type: Type 2  Disease course: Stable  How confident are you filling out medical forms by yourself:: Extremely  Diabetes management related comments/concerns: yes  Transportation concerns: No  Difficulty affording diabetes medication?: No  Difficulty affording diabetes testing supplies?: No  Other concerns:: None  Cultural Influences/Ethnic Background:  Not  or     Diabetes Symptoms & Complications:  Fatigue: No  Neuropathy: No  Polydipsia: Yes  Polyphagia: No  Polyuria: Sometimes  Visual change: No  Slow healing wounds: Sometimes  Complications assessed today?: Yes  Autonomic neuropathy: No  CVA: No  Heart disease: No  Nephropathy: No  Peripheral neuropathy: No  Peripheral Vascular Disease: No  Retinopathy: No  Sexual dysfunction: No    Patient Problem List and Family Medical History reviewed for relevant medical history, current medical status, and diabetes risk factors.    Vitals:  Wt 111.3 kg (245 lb 6.4 oz)   LMP 01/25/2009   BMI 42.45 kg/m    Estimated body mass index is 42.45 kg/m  as calculated from the following:    Height as of 1/31/23: 1.619 m (5' 3.75\").    Weight as of this encounter: 111.3 kg (245 lb 6.4 oz).   Last 3 BP:   BP Readings from Last 3 Encounters:   01/31/23 (!) 140/90   06/17/22 114/78   03/19/21 126/78       History   Smoking Status     Never   Smokeless Tobacco     Never       Labs:  Lab Results   Component Value Date    A1C 6.0 01/31/2023    A1C 5.5 03/03/2017     Lab Results   Component Value Date    GLC " 100 01/31/2023     06/10/2022     03/19/2021     Lab Results   Component Value Date     01/31/2023     03/19/2021     HDL Cholesterol   Date Value Ref Range Status   03/19/2021 52 >49 mg/dL Final     Direct Measure HDL   Date Value Ref Range Status   01/31/2023 54 >=50 mg/dL Final   ]  GFR Estimate   Date Value Ref Range Status   01/31/2023 79 >60 mL/min/1.73m2 Final     Comment:     eGFR calculated using 2021 CKD-EPI equation.   08/18/2020 75 >60 mL/min/[1.73_m2] Final     Comment:     Non  GFR Calc  Starting 12/18/2018, serum creatinine based estimated GFR (eGFR) will be   calculated using the Chronic Kidney Disease Epidemiology Collaboration   (CKD-EPI) equation.       GFR Estimate If Black   Date Value Ref Range Status   08/18/2020 87 >60 mL/min/[1.73_m2] Final     Comment:      GFR Calc  Starting 12/18/2018, serum creatinine based estimated GFR (eGFR) will be   calculated using the Chronic Kidney Disease Epidemiology Collaboration   (CKD-EPI) equation.       Lab Results   Component Value Date    CR 0.82 01/31/2023    CR 0.84 08/18/2020     No results found for: MICROALBUMIN    Healthy Eating:  Healthy Eating Assessed Today: Yes  Cultural/Protestant diet restrictions?: No  Meal planning/habits: Keeps food records  How many times a week on average do you eat food made away from home (restaurant/take-out)?: 1  Meals include: Breakfast, Lunch, Dinner, Morning Snack, Afternoon Snack  Breakfast: coffee-nut pod creamer- flaxseed muffin OR egg(ham, spinach,egg), 1 toast, 1/2 avacado OR 2good yogurt  Lunch: spring greens, tomatoes, cheese, ranch dressing ( 2 TBSP) sumo orange OR tuna, celery, carrots, red peppers, pumpkin seeds, beef sticks, fruit  Dinner: protein, broccoli/brussel sprouts/cauliflower, sweet potato OR meatballs, spaghetti squash, salad  Snacks: 2good yogurt, fruit, kind cereal  Beverages: Water, Tea, Coffee    Being Active:  Being Active  Assessed Today: Yes  Exercise:: Yes  Days per week of moderate to strenuous exercise (like a brisk walk): 4  On average, minutes per day of exercise at this level: 40  Exercise Minutes per Week: 160  Barrier to exercise: Time    Monitoring:  Monitoring Assessed Today: Yes  Did patient bring glucose meter to appointment? : Yes  Blood Glucose Meter: One Touch  Times checking blood sugar at home (number): 3  Times checking blood sugar at home (per): Day  Blood glucose trend: Fluctuating    Taking Medications:  Diabetes Medication(s)     Biguanides       metFORMIN (GLUCOPHAGE XR) 500 MG 24 hr tablet    Take 4 tablets (2,000 mg) by mouth daily (with dinner)          Taking Medication Assessed Today: Yes  Current Treatments: Diet, Oral Medication (taken by mouth)  Problems taking diabetes medications regularly?: No  Diabetes medication side effects?: No    Problem Solving:  Problem Solving Assessed Today: Yes  Is the patient at risk for hypoglycemia?: No    Reducing Risks:  Reducing Risks Assessed Today: Yes  Diabetes Risks: Age over 45 years, Sedentary Lifestyle, Family History, History of gestational diabetes  Additional female risks: Gestational Diabetes  CAD Risks: Diabetes Mellitus, Dyslipidemia, Obesity, Sedentary lifestyle, Post-menopausal  Has dilated eye exam at least once a year?: Yes  Sees dentist every 6 months?: Yes  Feet checked by healthcare provider in the last year?: Yes    Healthy Coping:  Healthy Coping Assessed Today: Yes  Emotional response to diabetes: Ready to learn  Informal Support system:: Children, Family, Friends, Spouse  Stage of change: ACTION (Actively working towards change)  Patient Activation Measure Survey Score:  RAJAN Score (Last Two) 1/25/2011 8/21/2020   RAJAN Raw Score 42 37   Activation Score 66 79.2   RAJAN Level 3 4         Care Plan and Education Provided:  Care Plan: Diabetes   Updates made by Norma Feliciano RN since 3/21/2023 12:00 AM      Problem: Diabetes Self-Management  Education Needed to Optimize Self-Care Behaviors       Goal: Healthy Eating - follow a healthy eating pattern for diabetes       Task: Provide education on portion control and consistency in amount, composition and timing of food intake Completed 3/21/2023   Responsible User: Norma Feliciano RN      Task: Provide education on weight management Completed 3/21/2023   Responsible User: Norma Feliciano RN      Task: Provide education on heart healthy eating Completed 3/21/2023   Responsible User: Norma Feliciano RN      Task: Develop individualized healthy eating plan with patient Completed 3/21/2023   Responsible User: Norma Feliciano RN      Goal: Being Active - get regular physical activity, working up to at least 150 minutes per week    This Visit's Progress: 100%   Note:    My Goal: I will add 10-15 minutes of daily activity    What I need to meet my goal: walk or use a smartphone matthias    I plan to meet my goal by this date: 3/21/23       Goal: Taking Medication - patient is consistently taking medications as directed       Task: Provide education on action of prescribed medication, including when to take and possible side effects Completed 3/21/2023   Responsible User: Norma Feliciano RN      Goal: Problem Solving - know how to prevent and manage short-term diabetes complications       Task: Provide education on high blood glucose - causes, signs/symptoms, prevention and treatment Completed 3/21/2023   Responsible User: Norma Feliciano RN      Task: Provide education on low blood glucose - causes, signs/symptoms, prevention, treatment, carrying a carbohydrate source at all times, and medical identification Completed 3/21/2023   Responsible User: Norma Feliciano RN      Task: Provide education on when to call a health care provider Completed 3/21/2023   Responsible User: Norma Feliciano RN      Goal: Reducing Risks - know how to prevent and treat long-term diabetes complications       Task: Provide  education on major complications of diabetes, prevention, early diagnostic measures and treatment of complications Completed 3/21/2023   Responsible User: Norma Feliciano RN      Task: Provide education on recommended care for dental, eye and foot health Completed 3/21/2023   Responsible User: Norma Feliciano RN      Task: Provide education on recommendations for heart health - lipid levels and goals, blood pressure and goals, and aspirin therapy, if indicated Completed 3/21/2023   Responsible User: Norma Feliciano RN      Goal: Healthy Coping - use available resources to cope with the challenges of managing diabetes       Task: Discuss recognizing feelings about having diabetes Completed 3/21/2023   Responsible User: Norma Feliciano RN      Task: Provide education on the benefits of making appropriate lifestyle changes Completed 3/21/2023   Responsible User: Norma Feliciano RN Vickie Baeyen BSN, RN, PHN, [facility]     Time Spent: 60 minutes  Encounter Type: Individual    Any diabetes medication dose changes were made via the CDE Protocol per the patient's referring provider. A copy of this encounter was shared with the provider.

## 2023-03-21 NOTE — PROGRESS NOTES
Diabetes Self-Management Education & Support    Presents for: Follow-up    Type of Service: In Person Visit    Assessment Type:   REPORTS:              Pt verbalized understanding of concepts discussed and recommendations provided today.         ASSESSMENT:  Review of CGM report. Glucose overall average 105mg/dl,  in target 99%, above target 0% and below target 1% of the time.     Patient is doing well with diet and activity. She has lost 2 more pounds. She is feeling successful in the changes she is making and feels it is sustainable. She request further guidance on carbohydrate counting today.     Reviewed carbohydrate sources and carbohydrate counting, portion control, meal plan ideas and snacks.  Reviewed the benefits of exercise to help with better blood glucose utilization. Discussed risks and complications of diabetes, including prevention and monitoring.       PLAN  Meal Plan Recommendation: eat 3 meals a day, have small snacks between meals, if needed, use portion control, and use plate planning method.   Aim for 30-45 grams of carbohydrates at meals, and 15 grams of carbohydrates at snacks.   Exercise / activity plan: continue with your activity, goal of at least 150 minutes per week.     Glucose monitoring: use the sage sensor. OR test your blood sugar with your meter, test daily- before or 2 hours after the start of a meal    Blood sugar goals:  Before meals:   2 hours after the start of a meal: less than 180    Discount Programs:  MyFreestyle Program (https://www.[employer]/us-en/myfreestyle.html)  To save on sensors, if told cost is more than $75: call [employer] line at [phone]      Software:    [employer] (www.[email or web address])     Follow up:  Follow-up diabetes yearly or sooner if needed.     See Care Plan for co-developed, patient-state behavior change goals.  AVS provided for patient today.    Education Materials Provided:  No new materials provided  "today      SUBJECTIVE/OBJECTIVE:  Presents for: Follow-up  Accompanied by: Self  Diabetes education in the past 24mo: No  Focus of Visit: CGM, Assistance w/ making life changes  Type of CGM visit: Personal CGM Start  Diabetes type: Type 2  Disease course: Stable  How confident are you filling out medical forms by yourself:: Extremely  Diabetes management related comments/concerns: yes  Transportation concerns: No  Difficulty affording diabetes medication?: No  Difficulty affording diabetes testing supplies?: No  Other concerns:: None  Cultural Influences/Ethnic Background:  Not  or     Diabetes Symptoms & Complications:  Fatigue: No  Neuropathy: No  Polydipsia: Yes  Polyphagia: No  Polyuria: Sometimes  Visual change: No  Slow healing wounds: Sometimes  Complications assessed today?: Yes  Autonomic neuropathy: No  CVA: No  Heart disease: No  Nephropathy: No  Peripheral neuropathy: No  Peripheral Vascular Disease: No  Retinopathy: No  Sexual dysfunction: No    Patient Problem List and Family Medical History reviewed for relevant medical history, current medical status, and diabetes risk factors.    Vitals:  Wt 111.3 kg (245 lb 6.4 oz)   LMP 01/25/2009   BMI 42.45 kg/m    Estimated body mass index is 42.45 kg/m  as calculated from the following:    Height as of 1/31/23: 1.619 m (5' 3.75\").    Weight as of this encounter: 111.3 kg (245 lb 6.4 oz).   Last 3 BP:   BP Readings from Last 3 Encounters:   01/31/23 (!) 140/90   06/17/22 114/78   03/19/21 126/78       History   Smoking Status     Never   Smokeless Tobacco     Never       Labs:  Lab Results   Component Value Date    A1C 6.0 01/31/2023    A1C 5.5 03/03/2017     Lab Results   Component Value Date     01/31/2023     06/10/2022     03/19/2021     Lab Results   Component Value Date     01/31/2023     03/19/2021     HDL Cholesterol   Date Value Ref Range Status   03/19/2021 52 >49 mg/dL Final     Direct Measure HDL "   Date Value Ref Range Status   01/31/2023 54 >=50 mg/dL Final   ]  GFR Estimate   Date Value Ref Range Status   01/31/2023 79 >60 mL/min/1.73m2 Final     Comment:     eGFR calculated using 2021 CKD-EPI equation.   08/18/2020 75 >60 mL/min/[1.73_m2] Final     Comment:     Non  GFR Calc  Starting 12/18/2018, serum creatinine based estimated GFR (eGFR) will be   calculated using the Chronic Kidney Disease Epidemiology Collaboration   (CKD-EPI) equation.       GFR Estimate If Black   Date Value Ref Range Status   08/18/2020 87 >60 mL/min/[1.73_m2] Final     Comment:      GFR Calc  Starting 12/18/2018, serum creatinine based estimated GFR (eGFR) will be   calculated using the Chronic Kidney Disease Epidemiology Collaboration   (CKD-EPI) equation.       Lab Results   Component Value Date    CR 0.82 01/31/2023    CR 0.84 08/18/2020     No results found for: MICROALBUMIN    Healthy Eating:  Healthy Eating Assessed Today: Yes  Cultural/Mu-ism diet restrictions?: No  Meal planning/habits: Keeps food records  How many times a week on average do you eat food made away from home (restaurant/take-out)?: 1  Meals include: Breakfast, Lunch, Dinner, Morning Snack, Afternoon Snack  Breakfast: coffee-nut pod creamer- flaxseed muffin OR egg(ham, spinach,egg), 1 toast, 1/2 avacado OR 2good yogurt  Lunch: spring greens, tomatoes, cheese, ranch dressing ( 2 TBSP) sumo orange OR tuna, celery, carrots, red peppers, pumpkin seeds, beef sticks, fruit  Dinner: protein, broccoli/brussel sprouts/cauliflower, sweet potato OR meatballs, spaghetti squash, salad  Snacks: 2good yogurt, fruit, kind cereal  Beverages: Water, Tea, Coffee    Being Active:  Being Active Assessed Today: Yes  Exercise:: Yes  Days per week of moderate to strenuous exercise (like a brisk walk): 4  On average, minutes per day of exercise at this level: 40  Exercise Minutes per Week: 160  Barrier to exercise: Time    Monitoring:  Monitoring  Assessed Today: Yes  Did patient bring glucose meter to appointment? : Yes  Blood Glucose Meter: One Touch  Times checking blood sugar at home (number): 3  Times checking blood sugar at home (per): Day  Blood glucose trend: Fluctuating    Taking Medications:  Diabetes Medication(s)     Biguanides       metFORMIN (GLUCOPHAGE XR) 500 MG 24 hr tablet    Take 4 tablets (2,000 mg) by mouth daily (with dinner)          Taking Medication Assessed Today: Yes  Current Treatments: Diet, Oral Medication (taken by mouth)  Problems taking diabetes medications regularly?: No  Diabetes medication side effects?: No    Problem Solving:  Problem Solving Assessed Today: Yes  Is the patient at risk for hypoglycemia?: No    Reducing Risks:  Reducing Risks Assessed Today: Yes  Diabetes Risks: Age over 45 years, Sedentary Lifestyle, Family History, History of gestational diabetes  Additional female risks: Gestational Diabetes  CAD Risks: Diabetes Mellitus, Dyslipidemia, Obesity, Sedentary lifestyle, Post-menopausal  Has dilated eye exam at least once a year?: Yes  Sees dentist every 6 months?: Yes  Feet checked by healthcare provider in the last year?: Yes    Healthy Coping:  Healthy Coping Assessed Today: Yes  Emotional response to diabetes: Ready to learn  Informal Support system:: Children, Family, Friends, Spouse  Stage of change: ACTION (Actively working towards change)  Patient Activation Measure Survey Score:  RAJAN Score (Last Two) 1/25/2011 8/21/2020   RAJAN Raw Score 42 37   Activation Score 66 79.2   RAJAN Level 3 4         Care Plan and Education Provided:  Care Plan: Diabetes   Updates made by Norma Feliciano, RN since 3/21/2023 12:00 AM      Problem: Diabetes Self-Management Education Needed to Optimize Self-Care Behaviors       Goal: Healthy Eating - follow a healthy eating pattern for diabetes       Task: Provide education on portion control and consistency in amount, composition and timing of food intake Completed 3/21/2023    Responsible User: Norma Feliciano RN      Task: Provide education on weight management Completed 3/21/2023   Responsible User: Norma Feliciano RN      Task: Provide education on heart healthy eating Completed 3/21/2023   Responsible User: Norma Feliciano RN      Task: Develop individualized healthy eating plan with patient Completed 3/21/2023   Responsible User: Norma Feliciano RN      Goal: Being Active - get regular physical activity, working up to at least 150 minutes per week    This Visit's Progress: 100%   Note:    My Goal: I will add 10-15 minutes of daily activity    What I need to meet my goal: walk or use a smartphone matthias    I plan to meet my goal by this date: 3/21/23       Goal: Taking Medication - patient is consistently taking medications as directed       Task: Provide education on action of prescribed medication, including when to take and possible side effects Completed 3/21/2023   Responsible User: Norma Feliciano RN      Goal: Problem Solving - know how to prevent and manage short-term diabetes complications       Task: Provide education on high blood glucose - causes, signs/symptoms, prevention and treatment Completed 3/21/2023   Responsible User: Norma Feliciano RN      Task: Provide education on low blood glucose - causes, signs/symptoms, prevention, treatment, carrying a carbohydrate source at all times, and medical identification Completed 3/21/2023   Responsible User: Norma Feliciano RN      Task: Provide education on when to call a health care provider Completed 3/21/2023   Responsible User: Norma Feliciano RN      Goal: Reducing Risks - know how to prevent and treat long-term diabetes complications       Task: Provide education on major complications of diabetes, prevention, early diagnostic measures and treatment of complications Completed 3/21/2023   Responsible User: Norma Feliciano RN      Task: Provide education on recommended care for dental, eye and foot health  Completed 3/21/2023   Responsible User: Norma Feliciano RN      Task: Provide education on recommendations for heart health - lipid levels and goals, blood pressure and goals, and aspirin therapy, if indicated Completed 3/21/2023   Responsible User: Norma Feliciano RN      Goal: Healthy Coping - use available resources to cope with the challenges of managing diabetes       Task: Discuss recognizing feelings about having diabetes Completed 3/21/2023   Responsible User: Norma Feliciano RN      Task: Provide education on the benefits of making appropriate lifestyle changes Completed 3/21/2023   Responsible User: Norma Feliciano RN Vickie Baeyen BSN, RN, PHN, [facility]     Time Spent: 60 minutes  Encounter Type: Individual    Any diabetes medication dose changes were made via the CDE Protocol per the patient's referring provider. A copy of this encounter was shared with the provider.

## 2023-03-21 NOTE — PATIENT INSTRUCTIONS
Care Plan:  Meal Plan Recommendation: eat 3 meals a day, have small snacks between meals, if needed, use portion control, and use plate planning method.   Aim for 30-45 grams of carbohydrates at meals, and 15 grams of carbohydrates at snacks.   Exercise / activity plan: continue with your activity, goal of at least 150 minutes per week.     Glucose monitoring: use the sage sensor. OR test your blood sugar with your meter, test daily- before or 2 hours after the start of a meal    Blood sugar goals:  Before meals:   2 hours after the start of a meal: less than 180    Discount Programs:  [employer] Program (https://www.[employer]/us-en/myfreestyle.html)  To save on sensors, if told cost is more than $75: call [employer] Voucher line at [phone]      Software:    [employer] (www.uTest)     Follow up:  Follow-up diabetes yearly or sooner if needed.      Bring blood glucose meter and logbook with you to all doctor and follow-up appointments.

## 2023-03-27 ENCOUNTER — DOCUMENTATION ONLY (OUTPATIENT)
Dept: LAB | Facility: CLINIC | Age: 63
End: 2023-03-27
Payer: COMMERCIAL

## 2023-03-27 DIAGNOSIS — E78.5 HYPERLIPIDEMIA LDL GOAL <70: ICD-10-CM

## 2023-03-27 DIAGNOSIS — E11.9 TYPE 2 DIABETES MELLITUS WITHOUT COMPLICATION, WITHOUT LONG-TERM CURRENT USE OF INSULIN (H): Primary | ICD-10-CM

## 2023-03-30 ENCOUNTER — MYC REFILL (OUTPATIENT)
Dept: PEDIATRICS | Facility: CLINIC | Age: 63
End: 2023-03-30
Payer: COMMERCIAL

## 2023-03-30 DIAGNOSIS — E11.9 TYPE 2 DIABETES MELLITUS WITHOUT COMPLICATION, WITHOUT LONG-TERM CURRENT USE OF INSULIN (H): ICD-10-CM

## 2023-03-31 ENCOUNTER — LAB (OUTPATIENT)
Dept: LAB | Facility: CLINIC | Age: 63
End: 2023-03-31
Payer: COMMERCIAL

## 2023-03-31 DIAGNOSIS — E78.5 HYPERLIPIDEMIA LDL GOAL <70: ICD-10-CM

## 2023-03-31 DIAGNOSIS — E11.9 TYPE 2 DIABETES MELLITUS WITHOUT COMPLICATION, WITHOUT LONG-TERM CURRENT USE OF INSULIN (H): ICD-10-CM

## 2023-03-31 LAB
ALBUMIN SERPL BCG-MCNC: 4.3 G/DL (ref 3.5–5.2)
ALP SERPL-CCNC: 80 U/L (ref 35–104)
ALT SERPL W P-5'-P-CCNC: 32 U/L (ref 10–35)
ANION GAP SERPL CALCULATED.3IONS-SCNC: 12 MMOL/L (ref 7–15)
APO A-I SERPL-MCNC: 10 MG/DL
AST SERPL W P-5'-P-CCNC: 25 U/L (ref 10–35)
BILIRUB SERPL-MCNC: 0.7 MG/DL
BUN SERPL-MCNC: 12.5 MG/DL (ref 8–23)
CALCIUM SERPL-MCNC: 10 MG/DL (ref 8.8–10.2)
CHLORIDE SERPL-SCNC: 104 MMOL/L (ref 98–107)
CHOLEST SERPL-MCNC: 196 MG/DL
CREAT SERPL-MCNC: 0.83 MG/DL (ref 0.51–0.95)
DEPRECATED HCO3 PLAS-SCNC: 27 MMOL/L (ref 22–29)
GFR SERPL CREATININE-BSD FRML MDRD: 78 ML/MIN/1.73M2
GLUCOSE SERPL-MCNC: 101 MG/DL (ref 70–99)
HBA1C MFR BLD: 5.9 % (ref 0–5.6)
HDLC SERPL-MCNC: 51 MG/DL
LDLC SERPL CALC-MCNC: 122 MG/DL
NONHDLC SERPL-MCNC: 145 MG/DL
POTASSIUM SERPL-SCNC: 4.4 MMOL/L (ref 3.4–5.3)
PROT SERPL-MCNC: 8 G/DL (ref 6.4–8.3)
SODIUM SERPL-SCNC: 143 MMOL/L (ref 136–145)
TRIGL SERPL-MCNC: 117 MG/DL

## 2023-03-31 PROCEDURE — 83036 HEMOGLOBIN GLYCOSYLATED A1C: CPT

## 2023-03-31 PROCEDURE — 83695 ASSAY OF LIPOPROTEIN(A): CPT

## 2023-03-31 PROCEDURE — 82306 VITAMIN D 25 HYDROXY: CPT

## 2023-03-31 PROCEDURE — 36415 COLL VENOUS BLD VENIPUNCTURE: CPT

## 2023-03-31 PROCEDURE — 80053 COMPREHEN METABOLIC PANEL: CPT

## 2023-03-31 PROCEDURE — 80061 LIPID PANEL: CPT

## 2023-03-31 RX ORDER — METFORMIN HCL 500 MG
2000 TABLET, EXTENDED RELEASE 24 HR ORAL
Qty: 360 TABLET | Refills: 0 | OUTPATIENT
Start: 2023-03-31

## 2023-03-31 NOTE — TELEPHONE ENCOUNTER
Duplicate, Refill sent 3/6/23 to [facility][id number] - [place], [place] - [address]. Patient should have medication available.     Lanie BIANCHI RN, BSN, PHN  [facility]  [phone]

## 2023-04-03 LAB — DEPRECATED CALCIDIOL+CALCIFEROL SERPL-MC: 42 UG/L (ref 20–75)

## 2023-04-04 ENCOUNTER — OFFICE VISIT (OUTPATIENT)
Dept: ENDOCRINOLOGY | Facility: CLINIC | Age: 63
End: 2023-04-04
Payer: COMMERCIAL

## 2023-04-04 VITALS
DIASTOLIC BLOOD PRESSURE: 94 MMHG | HEIGHT: 64 IN | RESPIRATION RATE: 20 BRPM | OXYGEN SATURATION: 98 % | TEMPERATURE: 97.7 F | HEART RATE: 84 BPM | SYSTOLIC BLOOD PRESSURE: 157 MMHG | WEIGHT: 244 LBS | BODY MASS INDEX: 41.66 KG/M2

## 2023-04-04 DIAGNOSIS — E78.2 MIXED HYPERLIPIDEMIA: ICD-10-CM

## 2023-04-04 DIAGNOSIS — E11.9 TYPE 2 DIABETES MELLITUS WITHOUT COMPLICATION, WITHOUT LONG-TERM CURRENT USE OF INSULIN (H): Primary | ICD-10-CM

## 2023-04-04 PROCEDURE — 99214 OFFICE O/P EST MOD 30 MIN: CPT | Performed by: PHYSICIAN ASSISTANT

## 2023-04-04 RX ORDER — EZETIMIBE 10 MG/1
10 TABLET ORAL DAILY
Qty: 30 TABLET | Refills: 3 | Status: SHIPPED | OUTPATIENT
Start: 2023-04-04 | End: 2024-03-08

## 2023-04-04 ASSESSMENT — PAIN SCALES - GENERAL: PAINLEVEL: NO PAIN (0)

## 2023-04-04 NOTE — PROGRESS NOTES
Assessment/Plan :   1. Type 2 DM. Parish is doing a great job in managing her blood sugars. Her A1C has improved and she has continued to lose weight. She has been working to eat a healthy, well-rounded diet. She has also been trying to increase daily exercise. We reviewed her current medications and I think she is doing well with metformin. I encouraged her to keep up the good work and we will follow-up in 6 mos.    2. Hyperlipidemia. Parish was hopeful that her LDL would have improved. She has been working really hard to lose weight and eat healthy. Unfortunately, her LDL remained elevated at 122 mg/dl. We did check a lipoprotein (a) and it is within normal range, which is a good sign, but I am recommending that she start some type of lipid lowering therapy. I understand her concerns regarding statin medication, so we will try ezetimibe. She is to to take one tablet daily. We will repeat a fasting lipid panel in follow-up.       I have independently reviewed and interpreted labs, imaging as indicated.      Chief complaint:  Vani is a 62 year old female who returns for routine follow-up regarding Type 2 DM and hyperlipidemia.    I have reviewed Care Everywhere including [facility], [facility], [facility], [place], [facility], [facility] , [facility], [place] lab reports, imaging reports and provider notes as indicated.      HISTORY OF PRESENT ILLNESS  Parish was originally diagnosed with PCOS/pre-diabetes around 5 to 6 yrs ago. She was started an metformin and she worked to make healthy lifestyle changes. She was able to lose weight and discontinue the metformin in 2018. She did great off of all medication for a couple years. She started working again around 2019 as a . It is difficult to get in as much exercise and sometimes she just doesn't feel like making dinner, so she relies on fast food. This led to sim weight gain and her A1C started to creep back up. She was diagnosed with  diabetes in June of 2022. At the time of diagnosis, she was restarted on metformin and she was given a prescription for simvastatin. Again, she focused on making lifestyle changes and she has been able to lose over 30 lbs. She remains stable on metformin but she would like to avoid starting the statin medication, if possible.    Since our last visit in January, Parish has continued to work on making healthy lifestyle changes. She picked up the FreeStyle Axel 2 sensor and it has helped her to fine tune her diet. She remains on metformin and she has been working on getting at least 10 min of exercise daily.    Endocrine relevant labs are as follows:   Latest Reference Range & Units 03/31/23 10:35   Hemoglobin A1C 0.0 - 5.6 % 5.9 (H)   (H): Data is abnormally high     Latest Reference Range & Units 03/31/23 10:35   LDL Cholesterol Calculated <=100 mg/dL 122 (H)   (H): Data is abnormally high     Latest Reference Range & Units 03/31/23 10:35   Lipoprotein (a) <30 mg/dL 10     REVIEW OF SYSTEMS  Answers for HPI/ROS submitted by the patient on 4/3/2023  General Symptoms: No  Skin Symptoms: No  HENT Symptoms: No  EYE SYMPTOMS: No  HEART SYMPTOMS: No  LUNG SYMPTOMS: No  INTESTINAL SYMPTOMS: No  URINARY SYMPTOMS: No  GYNECOLOGIC SYMPTOMS: No  BREAST SYMPTOMS: No  SKELETAL SYMPTOMS: No  BLOOD SYMPTOMS: No  NERVOUS SYSTEM SYMPTOMS: No  MENTAL HEALTH SYMPTOMS: No    Endocrine: positive for diabetes and hyperlipidemia  Skin: negative  Eyes: negative for, visual blurring  Ears/Nose/Throat: negative  Respiratory: No shortness of breath, dyspnea on exertion, cough, or hemoptysis  Cardiovascular: negative for, chest pain, lower extremity edema and exercise intolerance  Gastrointestinal: negative for, nausea, vomiting, constipation and diarrhea  Genitourinary: negative for, nocturia, dysuria, frequency and urgency  Musculoskeletal: negative for, joint pain, joint swelling, joint stiffness and muscular weakness  Neurologic: negative  for and numbness or tingling of feet  Psychiatric: negative  Hematologic/Lymphatic/Immunologic: negative    Past Medical History  Past Medical History:   Diagnosis Date     Chest pain, unspecified 06/2006    neg stress test in [facility]     Diabetes mellitus, type 2 (H) 06/2019     Gestational diabetes mellitus, delivered 04/25/2008     Obesity, unspecified      Thyroid nodule 2018       Medications  Current Outpatient Medications   Medication Sig Dispense Refill     Cholecalciferol (VITAMIN D-3 PO) Take 2,000 Units by mouth daily       Continuous Blood Gluc Sensor (FREESTYLE THONY 2 SENSOR) MISC 1 each every 14 days 2 each 11     metFORMIN (GLUCOPHAGE XR) 500 MG 24 hr tablet Take 4 tablets (2,000 mg) by mouth daily (with dinner) 360 tablet 0     OneTouch Delica Lancets 33G MISC 1 each daily 100 each 11     triamcinolone (KENALOG) 0.1 % external cream Apply topically 2 times daily 30 g 3     vitamin B complex with vitamin C (VITAMIN  B COMPLEX) tablet Take 1 tablet by mouth daily       blood glucose (ONETOUCH ULTRA) test strip Use to test blood sugar 3 times daily or as directed. 300 strip 0     blood glucose monitoring (ONETOUCH VERIO) meter device kit Use to test blood sugar 3 times daily or as directed. 1 kit 0     Continuous Blood Gluc Sensor (DEXCOM G6 SENSOR) MISC Change every 10 days. 3 each 2     Continuous Blood Gluc Sensor (FREESTYLE THONY 2 SENSOR) MISC 1 each every 14 days 6 each 0     Continuous Blood Gluc Transmit (DEXCOM G6 TRANSMITTER) MISC Change every 3 months. 1 each 1     simvastatin (ZOCOR) 20 MG tablet Take 1 tablet (20 mg) by mouth At Bedtime (Patient not taking: Reported on 4/4/2023) 90 tablet 3       Allergies  Allergies   Allergen Reactions     Sulfa Drugs Anaphylaxis     Dust Mites          Family History  family history includes Breast Cancer in her maternal aunt and maternal cousin; Breast Cancer (age of onset: 32) in her sister; Cancer - colorectal in her father; Cerebrovascular Disease in  "her mother; Cowden Syndrome in her brother and sister; Diabetes in her mother and other family members; Hypertension in her mother; Lipids in her mother; Prostate Cancer in her brother; Skin Cancer in her brother; Thyroid Cancer in her brother; Thyroid Disease in her brother and mother.    Social History  Social History     Tobacco Use     Smoking status: Never     Smokeless tobacco: Never   Vaping Use     Vaping status: Never Used   Substance Use Topics     Alcohol use: No     Drug use: No       Physical Exam  BP (!) 157/94 (BP Location: Right arm, Patient Position: Sitting, Cuff Size: Adult Regular)   Pulse 84   Temp 97.7  F (36.5  C) (Oral)   Resp 20   Ht 1.619 m (5' 3.75\")   Wt 110.7 kg (244 lb)   LMP 01/25/2009   SpO2 98%   BMI 42.21 kg/m    Body mass index is 42.21 kg/m .  GENERAL :  In no apparent distress  SKIN: Normal color, normal temperature, texture.  No hirsutism, alopecia or purple striae.     EYES: PERRL, EOMI, No scleral icterus,  No proptosis, conjunctival redness, stare, retraction  NECK: No visible masses. No palpable adenopathy, or masses. No carotid bruits.   NEURO: awake, alert, responds appropriately to questions.  Cranial nerves intact.   Moves all extremities; Gait normal.  No tremor of the outstretched hand.     EXTREMITIES: No clubbing, cyanosis or edema.    DATA REVIEW    FreeStyle Axel  Time in target range is over 90%      "

## 2023-04-04 NOTE — LETTER
4/4/2023         RE: Vani Daniel  [address] [facility] [place] [address]        Dear Colleague,    Thank you for referring your patient, Vani Daniel, to the [facility]. Please see a copy of my visit note below.    Assessment/Plan :   1. Type 2 DM. Parish is doing a great job in managing her blood sugars. Her A1C has improved and she has continued to lose weight. She has been working to eat a healthy, well-rounded diet. She has also been trying to increase daily exercise. We reviewed her current medications and I think she is doing well with metformin. I encouraged her to keep up the good work and we will follow-up in 6 mos.    2. Hyperlipidemia. Parish was hopeful that her LDL would have improved. She has been working really hard to lose weight and eat healthy. Unfortunately, her LDL remained elevated at 122 mg/dl. We did check a lipoprotein (a) and it is within normal range, which is a good sign, but I am recommending that she start some type of lipid lowering therapy. I understand her concerns regarding statin medication, so we will try ezetimibe. She is to to take one tablet daily. We will repeat a fasting lipid panel in follow-up.       I have independently reviewed and interpreted labs, imaging as indicated.      Chief complaint:  Vani is a 62 year old female who returns for routine follow-up regarding Type 2 DM and hyperlipidemia.    I have reviewed Care Everywhere including [facility], [facility], [facility], [facility], [place], [facility], [facility] , [facility], [place] lab reports, imaging reports and provider notes as indicated.      HISTORY OF PRESENT ILLNESS  Parish was originally diagnosed with PCOS/pre-diabetes around 5 to 6 yrs ago. She was started an metformin and she worked to make healthy lifestyle changes. She was able to lose weight and discontinue the metformin in 2018. She did great off of all medication for a couple years. She  started working again around 2019 as a . It is difficult to get in as much exercise and sometimes she just doesn't feel like making dinner, so she relies on fast food. This led to sim weight gain and her A1C started to creep back up. She was diagnosed with diabetes in June of 2022. At the time of diagnosis, she was restarted on metformin and she was given a prescription for simvastatin. Again, she focused on making lifestyle changes and she has been able to lose over 30 lbs. She remains stable on metformin but she would like to avoid starting the statin medication, if possible.    Since our last visit in January, Parish has continued to work on making healthy lifestyle changes. She picked up the FreeStyle Axel 2 sensor and it has helped her to fine tune her diet. She remains on metformin and she has been working on getting at least 10 min of exercise daily.    Endocrine relevant labs are as follows:   Latest Reference Range & Units 03/31/23 10:35   Hemoglobin A1C 0.0 - 5.6 % 5.9 (H)   (H): Data is abnormally high     Latest Reference Range & Units 03/31/23 10:35   LDL Cholesterol Calculated <=100 mg/dL 122 (H)   (H): Data is abnormally high     Latest Reference Range & Units 03/31/23 10:35   Lipoprotein (a) <30 mg/dL 10     REVIEW OF SYSTEMS  Answers for HPI/ROS submitted by the patient on 4/3/2023  General Symptoms: No  Skin Symptoms: No  HENT Symptoms: No  EYE SYMPTOMS: No  HEART SYMPTOMS: No  LUNG SYMPTOMS: No  INTESTINAL SYMPTOMS: No  URINARY SYMPTOMS: No  GYNECOLOGIC SYMPTOMS: No  BREAST SYMPTOMS: No  SKELETAL SYMPTOMS: No  BLOOD SYMPTOMS: No  NERVOUS SYSTEM SYMPTOMS: No  MENTAL HEALTH SYMPTOMS: No    Endocrine: positive for diabetes and hyperlipidemia  Skin: negative  Eyes: negative for, visual blurring  Ears/Nose/Throat: negative  Respiratory: No shortness of breath, dyspnea on exertion, cough, or hemoptysis  Cardiovascular: negative for, chest pain, lower extremity edema and exercise  intolerance  Gastrointestinal: negative for, nausea, vomiting, constipation and diarrhea  Genitourinary: negative for, nocturia, dysuria, frequency and urgency  Musculoskeletal: negative for, joint pain, joint swelling, joint stiffness and muscular weakness  Neurologic: negative for and numbness or tingling of feet  Psychiatric: negative  Hematologic/Lymphatic/Immunologic: negative    Past Medical History  Past Medical History:   Diagnosis Date     Chest pain, unspecified 06/2006    neg stress test in [facility]     Diabetes mellitus, type 2 (H) 06/2019     Gestational diabetes mellitus, delivered 04/25/2008     Obesity, unspecified      Thyroid nodule 2018       Medications  Current Outpatient Medications   Medication Sig Dispense Refill     Cholecalciferol (VITAMIN D-3 PO) Take 2,000 Units by mouth daily       Continuous Blood Gluc Sensor (FREESTYLE THONY 2 SENSOR) MISC 1 each every 14 days 2 each 11     metFORMIN (GLUCOPHAGE XR) 500 MG 24 hr tablet Take 4 tablets (2,000 mg) by mouth daily (with dinner) 360 tablet 0     OneTouch Delica Lancets 33G MISC 1 each daily 100 each 11     triamcinolone (KENALOG) 0.1 % external cream Apply topically 2 times daily 30 g 3     vitamin B complex with vitamin C (VITAMIN  B COMPLEX) tablet Take 1 tablet by mouth daily       blood glucose (ONETOUCH ULTRA) test strip Use to test blood sugar 3 times daily or as directed. 300 strip 0     blood glucose monitoring (ONETOUCH VERIO) meter device kit Use to test blood sugar 3 times daily or as directed. 1 kit 0     Continuous Blood Gluc Sensor (DEXCOM G6 SENSOR) MISC Change every 10 days. 3 each 2     Continuous Blood Gluc Sensor (FREESTYLE THONY 2 SENSOR) MISC 1 each every 14 days 6 each 0     Continuous Blood Gluc Transmit (DEXCOM G6 TRANSMITTER) MISC Change every 3 months. 1 each 1     simvastatin (ZOCOR) 20 MG tablet Take 1 tablet (20 mg) by mouth At Bedtime (Patient not taking: Reported on 4/4/2023) 90 tablet 3       Allergies  Allergies  "  Allergen Reactions     Sulfa Drugs Anaphylaxis     Dust Mites          Family History  family history includes Breast Cancer in her maternal aunt and maternal cousin; Breast Cancer (age of onset: 32) in her sister; Cancer - colorectal in her father; Cerebrovascular Disease in her mother; Cowden Syndrome in her brother and sister; Diabetes in her mother and other family members; Hypertension in her mother; Lipids in her mother; Prostate Cancer in her brother; Skin Cancer in her brother; Thyroid Cancer in her brother; Thyroid Disease in her brother and mother.    Social History  Social History     Tobacco Use     Smoking status: Never     Smokeless tobacco: Never   Vaping Use     Vaping status: Never Used   Substance Use Topics     Alcohol use: No     Drug use: No       Physical Exam  BP (!) 157/94 (BP Location: Right arm, Patient Position: Sitting, Cuff Size: Adult Regular)   Pulse 84   Temp 97.7  F (36.5  C) (Oral)   Resp 20   Ht 1.619 m (5' 3.75\")   Wt 110.7 kg (244 lb)   LMP 01/25/2009   SpO2 98%   BMI 42.21 kg/m    Body mass index is 42.21 kg/m .  GENERAL :  In no apparent distress  SKIN: Normal color, normal temperature, texture.  No hirsutism, alopecia or purple striae.     EYES: PERRL, EOMI, No scleral icterus,  No proptosis, conjunctival redness, stare, retraction  NECK: No visible masses. No palpable adenopathy, or masses. No carotid bruits.   NEURO: awake, alert, responds appropriately to questions.  Cranial nerves intact.   Moves all extremities; Gait normal.  No tremor of the outstretched hand.     EXTREMITIES: No clubbing, cyanosis or edema.    DATA REVIEW    FreeStyle Axel  Time in target range is over 90%          Again, thank you for allowing me to participate in the care of your patient.        Sincerely,        Susannah Park PA-C    "

## 2023-06-28 DIAGNOSIS — E11.9 TYPE 2 DIABETES MELLITUS WITHOUT COMPLICATION, WITHOUT LONG-TERM CURRENT USE OF INSULIN (H): ICD-10-CM

## 2023-06-29 ENCOUNTER — MYC REFILL (OUTPATIENT)
Dept: PEDIATRICS | Facility: CLINIC | Age: 63
End: 2023-06-29
Payer: COMMERCIAL

## 2023-06-29 ENCOUNTER — TELEPHONE (OUTPATIENT)
Dept: PEDIATRICS | Facility: CLINIC | Age: 63
End: 2023-06-29
Payer: COMMERCIAL

## 2023-06-29 DIAGNOSIS — E11.9 TYPE 2 DIABETES MELLITUS WITHOUT COMPLICATION, WITHOUT LONG-TERM CURRENT USE OF INSULIN (H): ICD-10-CM

## 2023-06-29 RX ORDER — METFORMIN HCL 500 MG
2000 TABLET, EXTENDED RELEASE 24 HR ORAL
Qty: 360 TABLET | Refills: 0 | Status: SHIPPED | OUTPATIENT
Start: 2023-06-29 | End: 2023-09-23

## 2023-06-29 RX ORDER — METFORMIN HCL 500 MG
2000 TABLET, EXTENDED RELEASE 24 HR ORAL
Qty: 360 TABLET | Refills: 0 | Status: CANCELLED | OUTPATIENT
Start: 2023-06-29

## 2023-06-29 NOTE — TELEPHONE ENCOUNTER
Pt is seeing Susannah Park for her diabetes and has filled this medication.     Closing encounter.

## 2023-06-29 NOTE — TELEPHONE ENCOUNTER
Medication Question or Refill        What medication are you calling about (include dose and sig)?: Metformin 500 mg 24hr tablets 4 tablets daily     Preferred Pharmacy:   [id number] - [facility] [address] [address] [facility] [address]  [address] [facility] [address]  Phone: [phone] Fax: [phone]      Controlled Substance Agreement on file:   CSA -- Patient Level:    CSA: None found at the patient level.       Who prescribed the medication?: Dr. Arias     Do you need a refill? Yes    When did you use the medication last? 06/28/23    Patient offered an appointment? No    Do you have any questions or concerns?  Yes: Patient has been trying to get this medication refilled for the past 3 days, has called the pharmacy and clinic. She currently has 1 pill left and is supposed to take 4 today before dinner. She also had issues with this refill for this medication last time she needed a refill. Her pharmacy is [facility].      Could we send this information to you in [facility] or would you prefer to receive a phone call?:   Patient would prefer a phone call   Okay to leave a detailed message?: Yes at Cell number on file:    Telephone Information:   Mobile [phone]

## 2023-06-29 NOTE — TELEPHONE ENCOUNTER
Routing refill request to provider for review/approval because:  Patient needs to be seen because it has been more than 1 year since last office visit.    Katy Lauren RN

## 2023-08-26 ENCOUNTER — HEALTH MAINTENANCE LETTER (OUTPATIENT)
Age: 63
End: 2023-08-26

## 2023-09-23 ENCOUNTER — MYC REFILL (OUTPATIENT)
Dept: PEDIATRICS | Facility: CLINIC | Age: 63
End: 2023-09-23
Payer: COMMERCIAL

## 2023-09-23 ENCOUNTER — MYC MEDICAL ADVICE (OUTPATIENT)
Dept: ENDOCRINOLOGY | Facility: CLINIC | Age: 63
End: 2023-09-23
Payer: COMMERCIAL

## 2023-09-23 DIAGNOSIS — E11.9 TYPE 2 DIABETES MELLITUS WITHOUT COMPLICATION, WITHOUT LONG-TERM CURRENT USE OF INSULIN (H): Primary | ICD-10-CM

## 2023-09-23 DIAGNOSIS — E04.1 THYROID NODULE: ICD-10-CM

## 2023-09-23 DIAGNOSIS — E11.9 TYPE 2 DIABETES MELLITUS WITHOUT COMPLICATION, WITHOUT LONG-TERM CURRENT USE OF INSULIN (H): ICD-10-CM

## 2023-09-23 DIAGNOSIS — E78.2 MIXED HYPERLIPIDEMIA: ICD-10-CM

## 2023-09-23 ASSESSMENT — ENCOUNTER SYMPTOMS
PALPITATIONS: 0
BLOATING: 0
HYPERTENSION: 0
ORTHOPNEA: 0
BLOOD IN STOOL: 0
RECTAL PAIN: 0
BOWEL INCONTINENCE: 0
NAIL CHANGES: 1
SYNCOPE: 0
HYPOTENSION: 0
LIGHT-HEADEDNESS: 0
LEG PAIN: 0
HEARTBURN: 0
EXERCISE INTOLERANCE: 0
SLEEP DISTURBANCES DUE TO BREATHING: 0
VOMITING: 0
NAUSEA: 0
DIARRHEA: 1
SKIN CHANGES: 0
JAUNDICE: 0
ABDOMINAL PAIN: 0
CONSTIPATION: 1
POOR WOUND HEALING: 0

## 2023-09-25 ENCOUNTER — HOSPITAL ENCOUNTER (OUTPATIENT)
Dept: MAMMOGRAPHY | Facility: CLINIC | Age: 63
Discharge: HOME | End: 2023-09-25
Attending: INTERNAL MEDICINE | Admitting: INTERNAL MEDICINE
Payer: COMMERCIAL

## 2023-09-25 DIAGNOSIS — Z12.31 VISIT FOR SCREENING MAMMOGRAM: ICD-10-CM

## 2023-09-25 PROCEDURE — 77067 SCR MAMMO BI INCL CAD: CPT

## 2023-09-25 RX ORDER — METFORMIN HCL 500 MG
2000 TABLET, EXTENDED RELEASE 24 HR ORAL
Qty: 360 TABLET | Refills: 0 | Status: SHIPPED | OUTPATIENT
Start: 2023-09-25 | End: 2024-07-11

## 2023-09-25 NOTE — TELEPHONE ENCOUNTER
"    Requested Prescriptions   Pending Prescriptions Disp Refills    metFORMIN (GLUCOPHAGE XR) 500 MG 24 hr tablet 360 tablet 3     Sig: Take 4 tablets (2,000 mg) by mouth daily (with dinner)       Biguanide Agents Failed - 9/25/2023  9:37 AM        Failed - Recent (12 mo) or future (30 days) visit within the authorizing provider's specialty      Patient has had an office visit with the authorizing provider or a provider within the authorizing providers department within the previous 12 mos or has a future within next 30 days. See \"Patient Info\" tab in inbasket, or \"Choose Columns\" in Meds & Orders section of the refill encounter.              Passed - Patient is age 10 or older        Passed - Patient does NOT have a diagnosis of CHF.        Passed - Medication is active on med list        Passed - Patient is not pregnant        Passed - Patient has not had a positive pregnancy test within the past 12 mos.              "

## 2023-09-27 ENCOUNTER — LAB (OUTPATIENT)
Dept: LAB | Facility: CLINIC | Age: 63
End: 2023-09-27
Payer: COMMERCIAL

## 2023-09-27 DIAGNOSIS — E11.9 TYPE 2 DIABETES MELLITUS WITHOUT COMPLICATION, WITHOUT LONG-TERM CURRENT USE OF INSULIN (H): ICD-10-CM

## 2023-09-27 DIAGNOSIS — E78.2 MIXED HYPERLIPIDEMIA: ICD-10-CM

## 2023-09-27 DIAGNOSIS — E04.1 THYROID NODULE: ICD-10-CM

## 2023-09-27 LAB
ALBUMIN SERPL BCG-MCNC: 4.2 G/DL (ref 3.5–5.2)
ALP SERPL-CCNC: 72 U/L (ref 35–104)
ALT SERPL W P-5'-P-CCNC: 26 U/L (ref 0–50)
ANION GAP SERPL CALCULATED.3IONS-SCNC: 11 MMOL/L (ref 7–15)
AST SERPL W P-5'-P-CCNC: 20 U/L (ref 0–45)
BILIRUB SERPL-MCNC: 0.8 MG/DL
BUN SERPL-MCNC: 14.7 MG/DL (ref 8–23)
CALCIUM SERPL-MCNC: 9.3 MG/DL (ref 8.8–10.2)
CHLORIDE SERPL-SCNC: 104 MMOL/L (ref 98–107)
CHOLEST SERPL-MCNC: 188 MG/DL
CREAT SERPL-MCNC: 0.9 MG/DL (ref 0.51–0.95)
DEPRECATED HCO3 PLAS-SCNC: 26 MMOL/L (ref 22–29)
EGFRCR SERPLBLD CKD-EPI 2021: 71 ML/MIN/1.73M2
GLUCOSE SERPL-MCNC: 105 MG/DL (ref 70–99)
HBA1C MFR BLD: 6.1 % (ref 0–5.6)
HDLC SERPL-MCNC: 51 MG/DL
LDLC SERPL CALC-MCNC: 106 MG/DL
NONHDLC SERPL-MCNC: 137 MG/DL
POTASSIUM SERPL-SCNC: 4.4 MMOL/L (ref 3.4–5.3)
PROT SERPL-MCNC: 7.4 G/DL (ref 6.4–8.3)
SODIUM SERPL-SCNC: 141 MMOL/L (ref 135–145)
TRIGL SERPL-MCNC: 153 MG/DL
TSH SERPL DL<=0.005 MIU/L-ACNC: 1.77 UIU/ML (ref 0.3–4.2)

## 2023-09-27 PROCEDURE — 84443 ASSAY THYROID STIM HORMONE: CPT

## 2023-09-27 PROCEDURE — 83036 HEMOGLOBIN GLYCOSYLATED A1C: CPT

## 2023-09-27 PROCEDURE — 36415 COLL VENOUS BLD VENIPUNCTURE: CPT

## 2023-09-27 PROCEDURE — 80053 COMPREHEN METABOLIC PANEL: CPT

## 2023-09-27 PROCEDURE — 80061 LIPID PANEL: CPT

## 2023-10-03 ENCOUNTER — OFFICE VISIT (OUTPATIENT)
Dept: ENDOCRINOLOGY | Facility: CLINIC | Age: 63
End: 2023-10-03
Payer: COMMERCIAL

## 2023-10-03 VITALS
TEMPERATURE: 98.7 F | DIASTOLIC BLOOD PRESSURE: 87 MMHG | SYSTOLIC BLOOD PRESSURE: 137 MMHG | OXYGEN SATURATION: 99 % | BODY MASS INDEX: 44.12 KG/M2 | HEART RATE: 89 BPM | HEIGHT: 64 IN | WEIGHT: 258.4 LBS | RESPIRATION RATE: 18 BRPM

## 2023-10-03 DIAGNOSIS — E78.2 MIXED HYPERLIPIDEMIA: ICD-10-CM

## 2023-10-03 DIAGNOSIS — E66.01 CLASS 3 SEVERE OBESITY DUE TO EXCESS CALORIES WITH SERIOUS COMORBIDITY AND BODY MASS INDEX (BMI) OF 45.0 TO 49.9 IN ADULT (H): ICD-10-CM

## 2023-10-03 DIAGNOSIS — E66.813 CLASS 3 SEVERE OBESITY DUE TO EXCESS CALORIES WITH SERIOUS COMORBIDITY AND BODY MASS INDEX (BMI) OF 45.0 TO 49.9 IN ADULT (H): ICD-10-CM

## 2023-10-03 DIAGNOSIS — E11.9 TYPE 2 DIABETES MELLITUS WITHOUT COMPLICATION, WITHOUT LONG-TERM CURRENT USE OF INSULIN (H): Primary | ICD-10-CM

## 2023-10-03 PROCEDURE — 99214 OFFICE O/P EST MOD 30 MIN: CPT | Performed by: PHYSICIAN ASSISTANT

## 2023-10-03 RX ORDER — TIRZEPATIDE 2.5 MG/.5ML
2.5 INJECTION, SOLUTION SUBCUTANEOUS
Qty: 2 ML | Refills: 1 | Status: SHIPPED | OUTPATIENT
Start: 2023-10-03 | End: 2023-10-19 | Stop reason: DRUGHIGH

## 2023-10-03 RX ORDER — FLUOCINONIDE GEL 0.5 MG/G
GEL TOPICAL
COMMUNITY
Start: 2022-09-12

## 2023-10-03 RX ORDER — CICLOPIROX 80 MG/ML
SOLUTION TOPICAL
COMMUNITY
Start: 2022-12-30

## 2023-10-03 NOTE — PROGRESS NOTES
Assessment/Plan :   Type 2 DM. Parish continues to take metformin daily, as directed. She does usually have diarrhea in the morning and she can occasionally see the metformin tablets in her bowel movement. She also feels like the metformin has started to wear off, in regards to weight control. We reviewed her recent laboratory results and we discussed medication options. Since she is having ongoing GI complaints related to metformin, I would like to switch to a once weekly GLP-1. This should continue to help with her blood sugar control but it may contribute more to weight loss. I will send in a new prescription for Mounjaro 2.5 mg weekly. If she has any trouble picking up the medication, she is to contact our office. We will plan on a follow-up visit in 3 mos.  Hyperlipidemia. Parish would like to avoid statin therapy, if at all possible. She understands that she is at a high risk of developing coronary artery disease but she doesn't have any family history of CAD and it is difficult for her to understand her risk. We discussed getting a CT calcium score. I will place an order today and she can look into insurance coverage.      I have independently reviewed and interpreted labs, imaging as indicated.      Chief complaint:  Vani is a 62 year old female who returns for follow-up of Type 2 DM.    I have reviewed Care Everywhere including [facility], [facility], [facility], [facility], [facility], [facility] , [facility], [facility] lab reports, imaging reports and provider notes as indicated.      HISTORY OF PRESENT ILLNESS  Parish is frustrated with her inability to lose weight. She has continued to take metformin XR 2000 mg daily. She has also continued to work on diet and exercise. Her blood sugars are looking great but she cannot seem to lose a pound. She uses the FreeStyle Axel to monitor his blood sugars and her blood sugars are really stable. She has not had any problems with hyperglycemia and/or  hypoglycemia, which is why she is so frustrated by her inability to lose weight.    Parish was diagnosed with PCOS/pre-diabetes about 6 yrs ago. She was started on metformin at that time and worked hard to make healthy lifestyle changes. She was able to discontinue the metformin around 2018. She had lost weight and her A1C had dropped to a non-diabetic range. However, in 2019 due to stress and, eventually, COVID her weight and blood sugars started to creep back up. She was then officially diagnosed with diabetes in 2022. She was restarted on metformin and she was able to lose about 30lbs over the year. This has since slowed. She feels stable but she is frustrated that she has not been able to keep up the progress.     Endocrine relevant labs are as follows:   Latest Reference Range & Units 01/31/23 10:42   Albumin Urine mg/L mg/L <12.0      Latest Reference Range & Units 09/27/23 08:34   Hemoglobin A1C 0.0 - 5.6 % 6.1 (H)   (H): Data is abnormally high    REVIEW OF SYSTEMS  Answers submitted by the patient for this visit:  Symptoms you have experienced in the last 30 days (Submitted on 9/23/2023)  General Symptoms: No  Skin Symptoms: Yes  HENT Symptoms: No  EYE SYMPTOMS: No  HEART SYMPTOMS: Yes  LUNG SYMPTOMS: No  INTESTINAL SYMPTOMS: Yes  URINARY SYMPTOMS: No  GYNECOLOGIC SYMPTOMS: No  BREAST SYMPTOMS: No  SKELETAL SYMPTOMS: No  BLOOD SYMPTOMS: No  NERVOUS SYSTEM SYMPTOMS: No  MENTAL HEALTH SYMPTOMS: No   (Submitted on 9/23/2023)  Changes in hair: Yes  Changes in moles/birth marks: No  Itching: Yes  Changes in nails: Yes  Acne: No  Hair in places you don't want it: Yes  Change in facial hair: Yes  Warts: No  Non-healing sores: No  Scarring: No  Flaking of skin: No  Color changes of hands/feet in cold : No  Sun sensitivity: No  Skin thickening: No  Please answer the questions below to tell us what condition you are experiencing: (Submitted on 9/23/2023)  Chest pain or pressure: No  Fast or irregular heartbeat:  No  Pain in legs with walking: No  Trouble breathing while lying down: No  Fingers or toes appear blue: No  High blood pressure: No  Low blood pressure: No  Fainting: No  Murmurs: No  Pacemaker: No  Varicose veins: Yes  Edema or swelling: Yes  Wake up at night with shortness of breath: No  Light-headedness: No  Exercise intolerance: No  Please answer the questions below to tell us what conditions you are experiencing: (Submitted on 9/23/2023)  Heart burn or indigestion: No  Nausea: No  Vomiting: No  Abdominal pain: No  Bloating: No  Constipation: Yes  Diarrhea: Yes  Blood in stool: No  Black stools: No  Rectal or Anal pain: No  Fecal incontinence: No  Yellowing of skin or eyes: No  Vomit with blood: No  Change in stools: No    Endocrine: positive for diabetes and obesity  Skin: negative  Eyes: negative for, visual blurring, redness, tearing, itching  Ears/Nose/Throat: negative for, persistent sore throat, hoarseness  Respiratory: No shortness of breath, dyspnea on exertion, cough, or hemoptysis  Cardiovascular: negative for, irregular heart beat, chest pain, dyspnea on exertion, and exercise intolerance  Gastrointestinal: positive for diarrhea, negative for, nausea, vomiting, and constipation  Genitourinary: negative for, nocturia, dysuria, frequency, and urgency  Musculoskeletal: negative for, muscular weakness, nocturnal cramping, and foot pain  Neurologic: negative for, numbness or tingling of hands, and numbness or tingling of feet  Psychiatric: negative  Hematologic/Lymphatic/Immunologic: negative    Past Medical History  Past Medical History:   Diagnosis Date    Chest pain, unspecified 06/2006    neg stress test in [facility]    Diabetes mellitus, type 2 (H) 06/2019    Gestational diabetes mellitus, delivered 04/25/2008    Obesity, unspecified     Thyroid nodule 2018       Medications  Current Outpatient Medications   Medication Sig Dispense Refill    blood glucose (ONETOUCH ULTRA) test strip Use to test blood sugar  "3 times daily or as directed. 300 strip 0    blood glucose monitoring (ONETOUCH VERIO) meter device kit Use to test blood sugar 3 times daily or as directed. 1 kit 0    Cholecalciferol (VITAMIN D-3 PO) Take 2,000 Units by mouth daily      ciclopirox (PENLAC) 8 % external solution 1 APPLICATION DAILY TOPICALLY APPLY TO NAIL DAILY.      Continuous Blood Gluc Sensor (FREESTYLE THONY 2 SENSOR) MISC 1 each every 14 days 2 each 11    ezetimibe (ZETIA) 10 MG tablet Take 1 tablet (10 mg) by mouth daily 30 tablet 3    fluocinonide (LIDEX) 0.05 % external gel       metFORMIN (GLUCOPHAGE XR) 500 MG 24 hr tablet Take 4 tablets (2,000 mg) by mouth daily (with dinner) 360 tablet 0    OneTouch Delica Lancets 33G MISC 1 each daily 100 each 11    triamcinolone (KENALOG) 0.1 % external cream Apply topically 2 times daily 30 g 3    vitamin B complex with vitamin C (VITAMIN  B COMPLEX) tablet Take 1 tablet by mouth daily         Allergies  Allergies   Allergen Reactions    Sulfa Antibiotics Anaphylaxis    Dust Mites          Family History  family history includes Breast Cancer in her maternal aunt and maternal cousin; Breast Cancer (age of onset: 32) in her sister; Cancer - colorectal in her father; Cerebrovascular Disease in her mother; Cowden Syndrome in her brother and sister; Diabetes in her mother and other family members; Hypertension in her mother; Lipids in her mother; Prostate Cancer in her brother; Skin Cancer in her brother; Thyroid Cancer in her brother; Thyroid Disease in her brother and mother.    Social History  Social History     Tobacco Use    Smoking status: Never    Smokeless tobacco: Never   Vaping Use    Vaping Use: Never used   Substance Use Topics    Alcohol use: No    Drug use: No       Physical Exam  /87 (BP Location: Left arm, Patient Position: Chair, Cuff Size: Adult Large)   Pulse 89   Temp 98.7  F (37.1  C) (Tympanic)   Resp 18   Ht 1.619 m (5' 3.74\")   Wt 117.2 kg (258 lb 6.4 oz)   LMP " 01/25/2009   SpO2 99%   Breastfeeding No   BMI 44.72 kg/m    Body mass index is 44.72 kg/m .  GENERAL :  In no apparent distress  SKIN: Normal color, normal temperature, texture.  No hirsutism, alopecia or purple striae.     EYES: PERRL, EOMI, No scleral icterus,  No proptosis, conjunctival redness, stare, retraction  RESP: Lungs clear to auscultation bilaterally  CARDIAC: Regular rate and rhythm, normal S1 S2, without murmurs, rubs or gallops    NEURO: awake, alert, responds appropriately to questions.  Cranial nerves intact.   Moves all extremities; Gait normal.  No tremor of the outstretched hand.    EXTREMITIES: No clubbing, cyanosis or edema.    DATA REVIEW  FreeStyle LibreView  Time in target range 99%  High 1%

## 2023-10-03 NOTE — LETTER
10/3/2023         RE: Vani Daniel  [address] [facility] [id number]        Dear Colleague,    Thank you for referring your patient, Vani Daniel, to the [facility]. Please see a copy of my visit note below.    Assessment/Plan :   Type 2 DM. Parish continues to take metformin daily, as directed. She does usually have diarrhea in the morning and she can occasionally see the metformin tablets in her bowel movement. She also feels like the metformin has started to wear off, in regards to weight control. We reviewed her recent laboratory results and we discussed medication options. Since she is having ongoing GI complaints related to metformin, I would like to switch to a once weekly GLP-1. This should continue to help with her blood sugar control but it may contribute more to weight loss. I will send in a new prescription for Mounjaro 2.5 mg weekly. If she has any trouble picking up the medication, she is to contact our office. We will plan on a follow-up visit in 3 mos.  Hyperlipidemia. Parish would like to avoid statin therapy, if at all possible. She understands that she is at a high risk of developing coronary artery disease but she doesn't have any family history of CAD and it is difficult for her to understand her risk. We discussed getting a CT calcium score. I will place an order today and she can look into insurance coverage.      I have independently reviewed and interpreted labs, imaging as indicated.      Chief complaint:  Vani is a 62 year old female who returns for follow-up of Type 2 DM.    I have reviewed Care Everywhere including [facility], [facility], [facility], [facility], [place], [facility], [facility] , [facility], [facility] lab reports, imaging reports and provider notes as indicated.      HISTORY OF PRESENT ILLNESS  Parish is frustrated with her inability to lose weight. She has continued to take metformin XR 2000 mg daily. She has  also continued to work on diet and exercise. Her blood sugars are looking great but she cannot seem to lose a pound. She uses the FreeStyle Axel to monitor his blood sugars and her blood sugars are really stable. She has not had any problems with hyperglycemia and/or hypoglycemia, which is why she is so frustrated by her inability to lose weight.    Parish was diagnosed with PCOS/pre-diabetes about 6 yrs ago. She was started on metformin at that time and worked hard to make healthy lifestyle changes. She was able to discontinue the metformin around 2018. She had lost weight and her A1C had dropped to a non-diabetic range. However, in 2019 due to stress and, eventually, COVID her weight and blood sugars started to creep back up. She was then officially diagnosed with diabetes in 2022. She was restarted on metformin and she was able to lose about 30lbs over the year. This has since slowed. She feels stable but she is frustrated that she has not been able to keep up the progress.     Endocrine relevant labs are as follows:   Latest Reference Range & Units 01/31/23 10:42   Albumin Urine mg/L mg/L <12.0      Latest Reference Range & Units 09/27/23 08:34   Hemoglobin A1C 0.0 - 5.6 % 6.1 (H)   (H): Data is abnormally high    REVIEW OF SYSTEMS  Answers submitted by the patient for this visit:  Symptoms you have experienced in the last 30 days (Submitted on 9/23/2023)  General Symptoms: No  Skin Symptoms: Yes  HENT Symptoms: No  EYE SYMPTOMS: No  HEART SYMPTOMS: Yes  LUNG SYMPTOMS: No  INTESTINAL SYMPTOMS: Yes  URINARY SYMPTOMS: No  GYNECOLOGIC SYMPTOMS: No  BREAST SYMPTOMS: No  SKELETAL SYMPTOMS: No  BLOOD SYMPTOMS: No  NERVOUS SYSTEM SYMPTOMS: No  MENTAL HEALTH SYMPTOMS: No   (Submitted on 9/23/2023)  Changes in hair: Yes  Changes in moles/birth marks: No  Itching: Yes  Changes in nails: Yes  Acne: No  Hair in places you don't want it: Yes  Change in facial hair: Yes  Warts: No  Non-healing sores: No  Scarring: No  Flaking  of skin: No  Color changes of hands/feet in cold : No  Sun sensitivity: No  Skin thickening: No  Please answer the questions below to tell us what condition you are experiencing: (Submitted on 9/23/2023)  Chest pain or pressure: No  Fast or irregular heartbeat: No  Pain in legs with walking: No  Trouble breathing while lying down: No  Fingers or toes appear blue: No  High blood pressure: No  Low blood pressure: No  Fainting: No  Murmurs: No  Pacemaker: No  Varicose veins: Yes  Edema or swelling: Yes  Wake up at night with shortness of breath: No  Light-headedness: No  Exercise intolerance: No  Please answer the questions below to tell us what conditions you are experiencing: (Submitted on 9/23/2023)  Heart burn or indigestion: No  Nausea: No  Vomiting: No  Abdominal pain: No  Bloating: No  Constipation: Yes  Diarrhea: Yes  Blood in stool: No  Black stools: No  Rectal or Anal pain: No  Fecal incontinence: No  Yellowing of skin or eyes: No  Vomit with blood: No  Change in stools: No    Endocrine: positive for diabetes and obesity  Skin: negative  Eyes: negative for, visual blurring, redness, tearing, itching  Ears/Nose/Throat: negative for, persistent sore throat, hoarseness  Respiratory: No shortness of breath, dyspnea on exertion, cough, or hemoptysis  Cardiovascular: negative for, irregular heart beat, chest pain, dyspnea on exertion, and exercise intolerance  Gastrointestinal: positive for diarrhea, negative for, nausea, vomiting, and constipation  Genitourinary: negative for, nocturia, dysuria, frequency, and urgency  Musculoskeletal: negative for, muscular weakness, nocturnal cramping, and foot pain  Neurologic: negative for, numbness or tingling of hands, and numbness or tingling of feet  Psychiatric: negative  Hematologic/Lymphatic/Immunologic: negative    Past Medical History  Past Medical History:   Diagnosis Date     Chest pain, unspecified 06/2006    neg stress test in [facility]     Diabetes mellitus, type 2  (H) 06/2019     Gestational diabetes mellitus, delivered 04/25/2008     Obesity, unspecified      Thyroid nodule 2018       Medications  Current Outpatient Medications   Medication Sig Dispense Refill     blood glucose (ONETOUCH ULTRA) test strip Use to test blood sugar 3 times daily or as directed. 300 strip 0     blood glucose monitoring (ONETOUCH VERIO) meter device kit Use to test blood sugar 3 times daily or as directed. 1 kit 0     Cholecalciferol (VITAMIN D-3 PO) Take 2,000 Units by mouth daily       ciclopirox (PENLAC) 8 % external solution 1 APPLICATION DAILY TOPICALLY APPLY TO NAIL DAILY.       Continuous Blood Gluc Sensor (FREESTYLE THONY 2 SENSOR) MISC 1 each every 14 days 2 each 11     ezetimibe (ZETIA) 10 MG tablet Take 1 tablet (10 mg) by mouth daily 30 tablet 3     fluocinonide (LIDEX) 0.05 % external gel        metFORMIN (GLUCOPHAGE XR) 500 MG 24 hr tablet Take 4 tablets (2,000 mg) by mouth daily (with dinner) 360 tablet 0     OneTouch Delica Lancets 33G MISC 1 each daily 100 each 11     triamcinolone (KENALOG) 0.1 % external cream Apply topically 2 times daily 30 g 3     vitamin B complex with vitamin C (VITAMIN  B COMPLEX) tablet Take 1 tablet by mouth daily         Allergies  Allergies   Allergen Reactions     Sulfa Antibiotics Anaphylaxis     Dust Mites          Family History  family history includes Breast Cancer in her maternal aunt and maternal cousin; Breast Cancer (age of onset: 32) in her sister; Cancer - colorectal in her father; Cerebrovascular Disease in her mother; Cowden Syndrome in her brother and sister; Diabetes in her mother and other family members; Hypertension in her mother; Lipids in her mother; Prostate Cancer in her brother; Skin Cancer in her brother; Thyroid Cancer in her brother; Thyroid Disease in her brother and mother.    Social History  Social History     Tobacco Use     Smoking status: Never     Smokeless tobacco: Never   Vaping Use     Vaping Use: Never used  "  Substance Use Topics     Alcohol use: No     Drug use: No       Physical Exam  /87 (BP Location: Left arm, Patient Position: Chair, Cuff Size: Adult Large)   Pulse 89   Temp 98.7  F (37.1  C) (Tympanic)   Resp 18   Ht 1.619 m (5' 3.74\")   Wt 117.2 kg (258 lb 6.4 oz)   LMP 01/25/2009   SpO2 99%   Breastfeeding No   BMI 44.72 kg/m    Body mass index is 44.72 kg/m .  GENERAL :  In no apparent distress  SKIN: Normal color, normal temperature, texture.  No hirsutism, alopecia or purple striae.     EYES: PERRL, EOMI, No scleral icterus,  No proptosis, conjunctival redness, stare, retraction  RESP: Lungs clear to auscultation bilaterally  CARDIAC: Regular rate and rhythm, normal S1 S2, without murmurs, rubs or gallops    NEURO: awake, alert, responds appropriately to questions.  Cranial nerves intact.   Moves all extremities; Gait normal.  No tremor of the outstretched hand.    EXTREMITIES: No clubbing, cyanosis or edema.    DATA REVIEW  FreeStyle LibreView  Time in target range 99%  High 1%      Again, thank you for allowing me to participate in the care of your patient.        Sincerely,        Susannah Park PA-C  "

## 2023-10-18 ENCOUNTER — MYC MEDICAL ADVICE (OUTPATIENT)
Dept: ENDOCRINOLOGY | Facility: CLINIC | Age: 63
End: 2023-10-18
Payer: COMMERCIAL

## 2023-10-18 DIAGNOSIS — E11.9 TYPE 2 DIABETES MELLITUS WITHOUT COMPLICATION, WITHOUT LONG-TERM CURRENT USE OF INSULIN (H): Primary | ICD-10-CM

## 2023-10-18 NOTE — TELEPHONE ENCOUNTER
Per 10/3/23 OV:    Since she is having ongoing GI complaints related to metformin, I would like to switch to a once weekly GLP-1. This should continue to help with her blood sugar control but it may contribute more to weight loss. I will send in a new prescription for Mounjaro 2.5 mg weekly.

## 2023-10-19 RX ORDER — TIRZEPATIDE 5 MG/.5ML
5 INJECTION, SOLUTION SUBCUTANEOUS
Qty: 2 ML | Refills: 1 | Status: SHIPPED | OUTPATIENT
Start: 2023-10-19 | End: 2024-01-03 | Stop reason: DRUGHIGH

## 2023-11-13 ENCOUNTER — HOSPITAL ENCOUNTER (OUTPATIENT)
Dept: CT IMAGING | Facility: CLINIC | Age: 63
Discharge: HOME | End: 2023-11-13
Attending: PHYSICIAN ASSISTANT | Admitting: PHYSICIAN ASSISTANT
Payer: COMMERCIAL

## 2023-11-13 DIAGNOSIS — E66.01 CLASS 3 SEVERE OBESITY DUE TO EXCESS CALORIES WITH SERIOUS COMORBIDITY AND BODY MASS INDEX (BMI) OF 45.0 TO 49.9 IN ADULT (H): ICD-10-CM

## 2023-11-13 DIAGNOSIS — E66.813 CLASS 3 SEVERE OBESITY DUE TO EXCESS CALORIES WITH SERIOUS COMORBIDITY AND BODY MASS INDEX (BMI) OF 45.0 TO 49.9 IN ADULT (H): ICD-10-CM

## 2023-11-13 DIAGNOSIS — E78.2 MIXED HYPERLIPIDEMIA: ICD-10-CM

## 2023-11-13 DIAGNOSIS — E11.9 TYPE 2 DIABETES MELLITUS WITHOUT COMPLICATION, WITHOUT LONG-TERM CURRENT USE OF INSULIN (H): ICD-10-CM

## 2023-11-13 PROCEDURE — 75571 CT HRT W/O DYE W/CA TEST: CPT | Mod: 26 | Performed by: INTERNAL MEDICINE

## 2023-11-13 PROCEDURE — 75571 CT HRT W/O DYE W/CA TEST: CPT

## 2023-12-18 ENCOUNTER — MYC MEDICAL ADVICE (OUTPATIENT)
Dept: ENDOCRINOLOGY | Facility: CLINIC | Age: 63
End: 2023-12-18
Payer: COMMERCIAL

## 2023-12-18 DIAGNOSIS — E11.9 TYPE 2 DIABETES MELLITUS WITHOUT COMPLICATION, WITHOUT LONG-TERM CURRENT USE OF INSULIN (H): Primary | ICD-10-CM

## 2023-12-18 DIAGNOSIS — E11.9 TYPE 2 DIABETES MELLITUS WITHOUT COMPLICATION, WITHOUT LONG-TERM CURRENT USE OF INSULIN (H): ICD-10-CM

## 2023-12-18 RX ORDER — TIRZEPATIDE 7.5 MG/.5ML
7.5 INJECTION, SOLUTION SUBCUTANEOUS
Qty: 2 ML | Refills: 0 | Status: SHIPPED | OUTPATIENT
Start: 2023-12-18 | End: 2024-01-03

## 2023-12-18 NOTE — TELEPHONE ENCOUNTER
"      Requested Prescriptions   Pending Prescriptions Disp Refills    tirzepatide (MOUNJARO) 7.5 MG/0.5ML pen 2 mL 0     Sig: Inject 7.5 mg Subcutaneous every 7 days       GLP-1 Agonists Protocol Passed - 12/18/2023  9:23 AM        Passed - HgbA1C in past 3 or 6 months     If HgbA1C is 8 or greater, it needs to be on file within the past 3 months.  If less than 8, must be on file within the past 6 months.     Recent Labs   Lab Test 09/27/23  0834   A1C 6.1*             Passed - Medication is active on med list        Passed - Patient is age 18 or older        Passed - No active pregnancy on record        Passed - Normal serum creatinine on file in past 12 months     Recent Labs   Lab Test 09/27/23  0834   CR 0.90       Ok to refill medication if creatinine is low          Passed - No positive pregnancy test in past 12 months        Passed - Recent (6 mo) or future (30 days) visit within the authorizing provider's specialty     Patient had office visit in the last 6 months or has a visit in the next 30 days with authorizing provider.  See \"Patient Info\" tab in inbasket, or \"Choose Columns\" in Meds & Orders section of the refill encounter.                 "

## 2023-12-22 NOTE — PROGRESS NOTES
Virtual Visit Details    Type of service:  Video Visit     Originating Location (pt. Location): Home    Distant Location (provider location):  Off-site  Platform used for Video Visit: Virgilio      Outcome for 12/22/23 11:07 AM: Mult[employer] message sent  Fay Smith MA  Outcome for 12/28/23 3:07 PM: Replied to [employer] message  Fay Smith MA  Outcome for 12/29/23 2:53 PM: Per patient, will send BG readings via [employer]  Fay Smith MA  Outcome for 01/02/24 12:20 PM:  Received blood sugar readings via email. Readings below.   Fay Smith MA    Patient is showing 4/5 MNCM met. Not on statin   Fay Smith MA

## 2023-12-29 ENCOUNTER — TELEPHONE (OUTPATIENT)
Dept: ENDOCRINOLOGY | Facility: CLINIC | Age: 63
End: 2023-12-29
Payer: COMMERCIAL

## 2023-12-29 NOTE — TELEPHONE ENCOUNTER
Spoke with patient. Pt will send screen shots from her onetouch matthias and send via [employer]. Fay Smith CMA on 12/29/2023 at 2:55 PM

## 2024-01-02 ENCOUNTER — LAB (OUTPATIENT)
Dept: LAB | Facility: CLINIC | Age: 64
End: 2024-01-02
Payer: COMMERCIAL

## 2024-01-02 DIAGNOSIS — E11.9 TYPE 2 DIABETES MELLITUS WITHOUT COMPLICATION, WITHOUT LONG-TERM CURRENT USE OF INSULIN (H): ICD-10-CM

## 2024-01-02 LAB
ANION GAP SERPL CALCULATED.3IONS-SCNC: 8 MMOL/L (ref 7–15)
BUN SERPL-MCNC: 13.5 MG/DL (ref 8–23)
CALCIUM SERPL-MCNC: 9.5 MG/DL (ref 8.8–10.2)
CHLORIDE SERPL-SCNC: 103 MMOL/L (ref 98–107)
CREAT SERPL-MCNC: 0.98 MG/DL (ref 0.51–0.95)
DEPRECATED HCO3 PLAS-SCNC: 29 MMOL/L (ref 22–29)
EGFRCR SERPLBLD CKD-EPI 2021: 64 ML/MIN/1.73M2
GLUCOSE SERPL-MCNC: 95 MG/DL (ref 70–99)
HBA1C MFR BLD: 5.8 % (ref 0–5.6)
POTASSIUM SERPL-SCNC: 4.1 MMOL/L (ref 3.4–5.3)
SODIUM SERPL-SCNC: 140 MMOL/L (ref 135–145)

## 2024-01-02 PROCEDURE — 36415 COLL VENOUS BLD VENIPUNCTURE: CPT

## 2024-01-02 PROCEDURE — 83036 HEMOGLOBIN GLYCOSYLATED A1C: CPT

## 2024-01-02 PROCEDURE — 80048 BASIC METABOLIC PNL TOTAL CA: CPT

## 2024-01-03 ENCOUNTER — VIRTUAL VISIT (OUTPATIENT)
Dept: ENDOCRINOLOGY | Facility: CLINIC | Age: 64
End: 2024-01-03
Payer: COMMERCIAL

## 2024-01-03 VITALS — WEIGHT: 246 LBS | BODY MASS INDEX: 42.57 KG/M2

## 2024-01-03 DIAGNOSIS — E11.9 TYPE 2 DIABETES MELLITUS WITHOUT COMPLICATION, WITHOUT LONG-TERM CURRENT USE OF INSULIN (H): ICD-10-CM

## 2024-01-03 DIAGNOSIS — E78.2 MIXED HYPERLIPIDEMIA: Primary | ICD-10-CM

## 2024-01-03 DIAGNOSIS — E55.9 VITAMIN D DEFICIENCY: ICD-10-CM

## 2024-01-03 PROCEDURE — 99214 OFFICE O/P EST MOD 30 MIN: CPT | Mod: 95 | Performed by: PHYSICIAN ASSISTANT

## 2024-01-03 RX ORDER — TIRZEPATIDE 7.5 MG/.5ML
7.5 INJECTION, SOLUTION SUBCUTANEOUS
Qty: 2 ML | Refills: 0 | Status: SHIPPED | OUTPATIENT
Start: 2024-01-03 | End: 2024-02-09 | Stop reason: DRUGHIGH

## 2024-01-03 ASSESSMENT — PAIN SCALES - GENERAL: PAINLEVEL: NO PAIN (0)

## 2024-01-03 NOTE — LETTER
1/3/2024         RE: Vani Daniel  [address] [facility] [address]        Dear Colleague,    Thank you for referring your patient, Vani Daniel, to the [facility]. Please see a copy of my visit note below.    Virtual Visit Details    Type of service:  Video Visit     Originating Location (pt. Location): Home    Distant Location (provider location):  Off-site  Platform used for Video Visit: [employer]      Outcome for 12/22/23 11:07 AM: True Fit message sent  Fay Smith MA  Outcome for 12/28/23 3:07 PM: Replied to True Fit message  Fay Smith MA  Outcome for 12/29/23 2:53 PM: Per patient, will send BG readings via [employer]  Fay Smith MA  Outcome for 01/02/24 12:20 PM:  Received blood sugar readings via email. Readings below.   Fay Smith MA    Patient is showing 4/5 MNCM met. Not on statin   Fay Smith MA                Assessment/Plan :   Type 2 DM. Parish is doing great. We reviewed her recent blood sugar logs along with her recent laboratory results. She really feels like the Mounjaro has helped to stabilize her blood sugars and led to an increase in weight loss. We discussed increasing to 10 mg weekly but she would like to remain on the 7.5 mg dose for one more month. I encouraged her to keep up the good work. I also encouraged her to continue to walk daily and to work on eating a healthy well rounded diet.  Hyperlipidemia. Parish has an adverse reaction to statin medications and she would like to avoid them, if at all possible. Her cholesterol is elevated. She underwent a CT coronary calcium score in November. We reviewed the results. She would like to remain off cholesterol medication but she will start a low dose, daily aspirin. We will follow-up in about 6 mos. She will have fasting laboratory testing done before our follow-up visit.     Due to the COVID 19 pandemic this visit was a telephone/video visit in order to help  prevent spread of infection in this patient and the general population. The patient gave verbal consent for the visit today. I have independently reviewed and interpreted labs, imaging as indicated.       Distant Location (provider location):  Off-site  Mode of Communication:  Video Conference via flik[employer]  Chart review/prep time 5    Joined the call at 1/3/2024, 8:01:04 am.  Left the call at 1/3/2024, 8:28:15 am.  You were on the call for 27 minutes 11 seconds .  36 minutes spent on the date of the encounter doing chart review, history and exam, documentation and further activities as noted above.      Chief complaint:  Vani is a 63 year old female who returns for follow-up of Type 2 DM.    I have reviewed Care Everywhere including [facility], [facility], [facility], [facility], [facility] , [facility], [place] lab reports, imaging reports and provider notes as indicated.      HISTORY OF PRESENT ILLNESS  Parish continues to work on managing her blood sugars and losing weight. She feels like she is doing well. She has continued to take metformin  mg daily along with Mounjaro 7.5 mg weekly. She does experience a little indigestion 2-3 days after the Mounjaro injection but, otherwise, she has no complaints. She continues to monitor her blood sugars with fingerstick testing every morning and her numbers appear to be stable.    Parish has not had any problems with severe hyperglycemia and/or hypoglycemia. She also denies any problems with blurred vision or worsening numbness/tingling in her feet. She has continued to lose weight but she has noticed that the weight loss seems to be slowing.    Parish was diagnosed with pre-diabetes about 6 yrs ago. She was started on metformin at that time and she worked hard on making healthy lifestyle changes. She was able to discontinue the metformin around 2018 due to improvement in her blood sugars. Unfortunately, in 2019, she started to regain the  weight that she lost and her hemoglobin A1C creeped up. She was restarted on metformin in 2022. This seemed to work well, until recently. She started to develop more GI side effects with the metformin and her weight loss slowed. We started her on Mounjaro in October of 2023.    Endocrine relevant labs are as follows:   Latest Reference Range & Units 01/02/24 09:48   Hemoglobin A1C 0.0 - 5.6 % 5.8 (H)   (H): Data is abnormally high     Latest Reference Range & Units 01/31/23 10:42   Albumin Urine mg/L mg/L <12.0     REVIEW OF SYSTEMS    Endocrine: positive for diabetes and obesity  Skin: negative  Eyes: negative for, visual blurring, redness, tearing  Ears/Nose/Throat: positive for nasal congestion, postnasal drainage, negative for, persistent sore throat, hoarseness  Respiratory: No shortness of breath, dyspnea on exertion, cough, or hemoptysis  Cardiovascular: negative for, chest pain, dyspnea on exertion, lower extremity edema, and exercise intolerance  Gastrointestinal: negative for, nausea, vomiting, constipation, and diarrhea  Genitourinary: negative for, nocturia, dysuria, frequency, and urgency  Musculoskeletal: negative for, muscular weakness, nocturnal cramping, and foot pain  Neurologic: negative for, local weakness, numbness or tingling of hands, and numbness or tingling of feet  Psychiatric: negative  Hematologic/Lymphatic/Immunologic: negative    Past Medical History  Past Medical History:   Diagnosis Date     Chest pain, unspecified 06/2006    neg stress test in [facility]     Diabetes mellitus, type 2 (H) 06/2019     Gestational diabetes mellitus, delivered 04/25/2008     Obesity, unspecified      Thyroid nodule 2018       Medications  Current Outpatient Medications   Medication Sig Dispense Refill     ASPIRIN NOT PRESCRIBED (INTENTIONAL) Please choose reason not prescribed from choices below.       blood glucose (ONETOUCH ULTRA) test strip Use to test blood sugar 3 times daily or as directed. 300 strip 0      blood glucose monitoring (ONETOUCH VERIO) meter device kit Use to test blood sugar 3 times daily or as directed. 1 kit 0     Cholecalciferol (VITAMIN D-3 PO) Take 2,000 Units by mouth daily       ciclopirox (PENLAC) 8 % external solution 1 APPLICATION DAILY TOPICALLY APPLY TO NAIL DAILY.       Continuous Blood Gluc Sensor (FREESTYLE THONY 2 SENSOR) MISC 1 each every 14 days 2 each 11     ezetimibe (ZETIA) 10 MG tablet Take 1 tablet (10 mg) by mouth daily 30 tablet 3     fluocinonide (LIDEX) 0.05 % external gel        metFORMIN (GLUCOPHAGE XR) 500 MG 24 hr tablet Take 4 tablets (2,000 mg) by mouth daily (with dinner) 360 tablet 0     OneTouch Delica Lancets 33G MISC 1 each daily 100 each 11     STATIN NOT PRESCRIBED (INTENTIONAL) Please choose reason not prescribed from choices below.       tirzepatide (MOUNJARO) 5 MG/0.5ML pen Inject 5 mg Subcutaneous every 7 days 2 mL 1     tirzepatide (MOUNJARO) 7.5 MG/0.5ML pen Inject 7.5 mg Subcutaneous every 7 days 2 mL 0     triamcinolone (KENALOG) 0.1 % external cream Apply topically 2 times daily 30 g 3     vitamin B complex with vitamin C (VITAMIN  B COMPLEX) tablet Take 1 tablet by mouth daily         Allergies  Allergies   Allergen Reactions     Sulfa Antibiotics Anaphylaxis     Dust Mites        Family History  family history includes Breast Cancer in her maternal aunt and maternal cousin; Breast Cancer (age of onset: 32) in her sister; Cancer - colorectal in her father; Cerebrovascular Disease in her mother; Cowden Syndrome in her brother and sister; Diabetes in her mother and other family members; Hypertension in her mother; Lipids in her mother; Prostate Cancer in her brother; Skin Cancer in her brother; Thyroid Cancer in her brother; Thyroid Disease in her brother and mother.    Social History  Social History     Tobacco Use     Smoking status: Never     Smokeless tobacco: Never   Vaping Use     Vaping Use: Never used   Substance Use Topics     Alcohol use: No      Drug use: No       Physical Exam  Body mass index is 42.57 kg/m .  GENERAL: no distress  SKIN: Visible skin clear. No significant rash, abnormal pigmentation or lesions.  EYES: Eyes grossly normal to inspection.  No discharge or erythema, or obvious scleral/conjunctival abnormalities.  NECK: visible goiter is not present; no visible neck masses  RESP: No audible wheeze, cough, or visible cyanosis.  No visible retractions or increased work of breathing.  Pt sounds congested but no cough  NEURO: Awake, alert, responds appropriately to questions.  Mentation and speech fluent.  PSYCH:affect normal and appearance well-groomed.      DATA REVIEW  Please see attached glucose logs  Ave BG 96 mg/dl        Again, thank you for allowing me to participate in the care of your patient.        Sincerely,        Susannah Park PA-C

## 2024-01-03 NOTE — PROGRESS NOTES
Assessment/Plan :   Type 2 DM. Parish is doing great. We reviewed her recent blood sugar logs along with her recent laboratory results. She really feels like the Mounjaro has helped to stabilize her blood sugars and led to an increase in weight loss. We discussed increasing to 10 mg weekly but she would like to remain on the 7.5 mg dose for one more month. I encouraged her to keep up the good work. I also encouraged her to continue to walk daily and to work on eating a healthy well rounded diet.  Hyperlipidemia. Parish has an adverse reaction to statin medications and she would like to avoid them, if at all possible. Her cholesterol is elevated. She underwent a CT coronary calcium score in November. We reviewed the results. She would like to remain off cholesterol medication but she will start a low dose, daily aspirin. We will follow-up in about 6 mos. She will have fasting laboratory testing done before our follow-up visit.     Due to the COVID 19 pandemic this visit was a telephone/video visit in order to help prevent spread of infection in this patient and the general population. The patient gave verbal consent for the visit today. I have independently reviewed and interpreted labs, imaging as indicated.       Distant Location (provider location):  Off-site  Mode of Communication:  Video Conference via [employer]  Chart review/prep time 5    Joined the call at 1/3/2024, 8:01:04 am.  Left the call at 1/3/2024, 8:28:15 am.  You were on the call for 27 minutes 11 seconds .  36 minutes spent on the date of the encounter doing chart review, history and exam, documentation and further activities as noted above.      Chief complaint:  Vani is a 63 year old female who returns for follow-up of Type 2 DM.    I have reviewed Care Everywhere including [facility], [facility], [facility], [facility], [place], [facility], [facility] , [facility], [place] lab reports, imaging reports and provider notes as indicated.       HISTORY OF PRESENT ILLNESS  Parish continues to work on managing her blood sugars and losing weight. She feels like she is doing well. She has continued to take metformin  mg daily along with Mounjaro 7.5 mg weekly. She does experience a little indigestion 2-3 days after the Mounjaro injection but, otherwise, she has no complaints. She continues to monitor her blood sugars with fingerstick testing every morning and her numbers appear to be stable.    Parish has not had any problems with severe hyperglycemia and/or hypoglycemia. She also denies any problems with blurred vision or worsening numbness/tingling in her feet. She has continued to lose weight but she has noticed that the weight loss seems to be slowing.    Parish was diagnosed with pre-diabetes about 6 yrs ago. She was started on metformin at that time and she worked hard on making healthy lifestyle changes. She was able to discontinue the metformin around 2018 due to improvement in her blood sugars. Unfortunately, in 2019, she started to regain the weight that she lost and her hemoglobin A1C creeped up. She was restarted on metformin in 2022. This seemed to work well, until recently. She started to develop more GI side effects with the metformin and her weight loss slowed. We started her on Mounjaro in October of 2023.    Endocrine relevant labs are as follows:   Latest Reference Range & Units 01/02/24 09:48   Hemoglobin A1C 0.0 - 5.6 % 5.8 (H)   (H): Data is abnormally high     Latest Reference Range & Units 01/31/23 10:42   Albumin Urine mg/L mg/L <12.0     REVIEW OF SYSTEMS    Endocrine: positive for diabetes and obesity  Skin: negative  Eyes: negative for, visual blurring, redness, tearing  Ears/Nose/Throat: positive for nasal congestion, postnasal drainage, negative for, persistent sore throat, hoarseness  Respiratory: No shortness of breath, dyspnea on exertion, cough, or hemoptysis  Cardiovascular: negative for, chest pain, dyspnea on  exertion, lower extremity edema, and exercise intolerance  Gastrointestinal: negative for, nausea, vomiting, constipation, and diarrhea  Genitourinary: negative for, nocturia, dysuria, frequency, and urgency  Musculoskeletal: negative for, muscular weakness, nocturnal cramping, and foot pain  Neurologic: negative for, local weakness, numbness or tingling of hands, and numbness or tingling of feet  Psychiatric: negative  Hematologic/Lymphatic/Immunologic: negative    Past Medical History  Past Medical History:   Diagnosis Date    Chest pain, unspecified 06/2006    neg stress test in [facility]    Diabetes mellitus, type 2 (H) 06/2019    Gestational diabetes mellitus, delivered 04/25/2008    Obesity, unspecified     Thyroid nodule 2018       Medications  Current Outpatient Medications   Medication Sig Dispense Refill    ASPIRIN NOT PRESCRIBED (INTENTIONAL) Please choose reason not prescribed from choices below.      blood glucose (ONETOUCH ULTRA) test strip Use to test blood sugar 3 times daily or as directed. 300 strip 0    blood glucose monitoring (ONETOUCH VERIO) meter device kit Use to test blood sugar 3 times daily or as directed. 1 kit 0    Cholecalciferol (VITAMIN D-3 PO) Take 2,000 Units by mouth daily      ciclopirox (PENLAC) 8 % external solution 1 APPLICATION DAILY TOPICALLY APPLY TO NAIL DAILY.      Continuous Blood Gluc Sensor (FREESTYLE THONY 2 SENSOR) MISC 1 each every 14 days 2 each 11    ezetimibe (ZETIA) 10 MG tablet Take 1 tablet (10 mg) by mouth daily 30 tablet 3    fluocinonide (LIDEX) 0.05 % external gel       metFORMIN (GLUCOPHAGE XR) 500 MG 24 hr tablet Take 4 tablets (2,000 mg) by mouth daily (with dinner) 360 tablet 0    OneTouch Delica Lancets 33G MISC 1 each daily 100 each 11    STATIN NOT PRESCRIBED (INTENTIONAL) Please choose reason not prescribed from choices below.      tirzepatide (MOUNJARO) 5 MG/0.5ML pen Inject 5 mg Subcutaneous every 7 days 2 mL 1    tirzepatide (MOUNJARO) 7.5 MG/0.5ML  pen Inject 7.5 mg Subcutaneous every 7 days 2 mL 0    triamcinolone (KENALOG) 0.1 % external cream Apply topically 2 times daily 30 g 3    vitamin B complex with vitamin C (VITAMIN  B COMPLEX) tablet Take 1 tablet by mouth daily         Allergies  Allergies   Allergen Reactions    Sulfa Antibiotics Anaphylaxis    Dust Mites        Family History  family history includes Breast Cancer in her maternal aunt and maternal cousin; Breast Cancer (age of onset: 32) in her sister; Cancer - colorectal in her father; Cerebrovascular Disease in her mother; Cowden Syndrome in her brother and sister; Diabetes in her mother and other family members; Hypertension in her mother; Lipids in her mother; Prostate Cancer in her brother; Skin Cancer in her brother; Thyroid Cancer in her brother; Thyroid Disease in her brother and mother.    Social History  Social History     Tobacco Use    Smoking status: Never    Smokeless tobacco: Never   Vaping Use    Vaping Use: Never used   Substance Use Topics    Alcohol use: No    Drug use: No       Physical Exam  Body mass index is 42.57 kg/m .  GENERAL: no distress  SKIN: Visible skin clear. No significant rash, abnormal pigmentation or lesions.  EYES: Eyes grossly normal to inspection.  No discharge or erythema, or obvious scleral/conjunctival abnormalities.  NECK: visible goiter is not present; no visible neck masses  RESP: No audible wheeze, cough, or visible cyanosis.  No visible retractions or increased work of breathing.  Pt sounds congested but no cough  NEURO: Awake, alert, responds appropriately to questions.  Mentation and speech fluent.  PSYCH:affect normal and appearance well-groomed.      DATA REVIEW  Please see attached glucose logs  Ave BG 96 mg/dl

## 2024-01-03 NOTE — NURSING NOTE
Is the patient currently in the state of [place]? YES    Visit mode:VIDEO    If the visit is dropped, the patient can be reconnected by: VIDEO VISIT: Text to cell phone:   Telephone Information:   Mobile [phone]       Will anyone else be joining the visit? NO  (If patient encounters technical issues they should call [phone]    How would you like to obtain your AVS? MyChart    Are changes needed to the allergy or medication list? No, Pt declined allergy review, and Pt declined med review    Reason for visit: KINGSTON MCKEON

## 2024-01-13 ENCOUNTER — HEALTH MAINTENANCE LETTER (OUTPATIENT)
Age: 64
End: 2024-01-13

## 2024-02-09 DIAGNOSIS — E11.9 TYPE 2 DIABETES MELLITUS WITHOUT COMPLICATION, WITHOUT LONG-TERM CURRENT USE OF INSULIN (H): ICD-10-CM

## 2024-02-09 RX ORDER — TIRZEPATIDE 10 MG/.5ML
10 INJECTION, SOLUTION SUBCUTANEOUS
Qty: 2 ML | Refills: 1 | Status: SHIPPED | OUTPATIENT
Start: 2024-02-09 | End: 2024-03-21 | Stop reason: DRUGHIGH

## 2024-03-06 SDOH — HEALTH STABILITY: PHYSICAL HEALTH: ON AVERAGE, HOW MANY DAYS PER WEEK DO YOU ENGAGE IN MODERATE TO STRENUOUS EXERCISE (LIKE A BRISK WALK)?: 3 DAYS

## 2024-03-06 SDOH — HEALTH STABILITY: PHYSICAL HEALTH: ON AVERAGE, HOW MANY MINUTES DO YOU ENGAGE IN EXERCISE AT THIS LEVEL?: 20 MIN

## 2024-03-06 ASSESSMENT — SOCIAL DETERMINANTS OF HEALTH (SDOH): HOW OFTEN DO YOU GET TOGETHER WITH FRIENDS OR RELATIVES?: TWICE A WEEK

## 2024-03-08 ENCOUNTER — OFFICE VISIT (OUTPATIENT)
Dept: PEDIATRICS | Facility: CLINIC | Age: 64
End: 2024-03-08
Payer: COMMERCIAL

## 2024-03-08 VITALS
HEART RATE: 89 BPM | RESPIRATION RATE: 20 BRPM | DIASTOLIC BLOOD PRESSURE: 84 MMHG | HEIGHT: 64 IN | OXYGEN SATURATION: 97 % | WEIGHT: 252.6 LBS | SYSTOLIC BLOOD PRESSURE: 130 MMHG | TEMPERATURE: 97 F | BODY MASS INDEX: 43.13 KG/M2

## 2024-03-08 DIAGNOSIS — E66.01 CLASS 3 SEVERE OBESITY DUE TO EXCESS CALORIES WITH SERIOUS COMORBIDITY AND BODY MASS INDEX (BMI) OF 40.0 TO 44.9 IN ADULT (H): ICD-10-CM

## 2024-03-08 DIAGNOSIS — E66.813 CLASS 3 SEVERE OBESITY DUE TO EXCESS CALORIES WITH SERIOUS COMORBIDITY AND BODY MASS INDEX (BMI) OF 40.0 TO 44.9 IN ADULT (H): ICD-10-CM

## 2024-03-08 DIAGNOSIS — E11.9 TYPE 2 DIABETES MELLITUS WITHOUT COMPLICATION, WITHOUT LONG-TERM CURRENT USE OF INSULIN (H): ICD-10-CM

## 2024-03-08 DIAGNOSIS — Z78.0 ASYMPTOMATIC POSTMENOPAUSAL STATE: ICD-10-CM

## 2024-03-08 DIAGNOSIS — E55.9 VITAMIN D DEFICIENCY: ICD-10-CM

## 2024-03-08 DIAGNOSIS — Z00.00 ROUTINE GENERAL MEDICAL EXAMINATION AT A HEALTH CARE FACILITY: Primary | ICD-10-CM

## 2024-03-08 PROCEDURE — 99397 PER PM REEVAL EST PAT 65+ YR: CPT | Performed by: INTERNAL MEDICINE

## 2024-03-08 RX ORDER — ASPIRIN 81 MG/1
81 TABLET ORAL DAILY
COMMUNITY

## 2024-03-08 RX ORDER — MULTIVIT WITH MINERALS/LUTEIN
1 TABLET ORAL DAILY
COMMUNITY

## 2024-03-08 ASSESSMENT — PAIN SCALES - GENERAL: PAINLEVEL: NO PAIN (0)

## 2024-03-08 NOTE — PATIENT INSTRUCTIONS
Preventive Care Advice   This is general advice given by our system to help you stay healthy. However, your care team may have specific advice just for you. Please talk to your care team about your preventive care needs.  Nutrition  Eat 5 or more servings of fruits and vegetables each day.  Try wheat bread, brown rice and whole grain pasta (instead of white bread, rice, and pasta).  Get enough calcium and vitamin D. Check the label on foods and aim for 100% of the RDA (recommended daily allowance).  Lifestyle  Exercise at least 150 minutes each week   (30 minutes a day, 5 days a week).  Do muscle strengthening activities 2 days a week. These help control your weight and prevent disease.  No smoking.  Wear sunscreen to prevent skin cancer.  Have a dental exam and cleaning every 6 months.  Yearly exams  See your health care team every year to talk about:  Any changes in your health.  Any medicines your care team has prescribed.  Preventive care, family planning, and ways to prevent chronic diseases.  Shots (vaccines)   HPV shots (up to age 26), if you've never had them before.  Hepatitis B shots (up to age 59), if you've never had them before.  COVID-19 shot: Get this shot when it's due.  Flu shot: Get a flu shot every year.  Tetanus shot: Get a tetanus shot every 10 years.  Pneumococcal, hepatitis A, and RSV shots: Ask your care team if you need these based on your risk.  Shingles shot (for age 50 and up).  General health tests  Diabetes screening:  Starting at age 35, Get screened for diabetes at least every 3 years.  If you are younger than age 35, ask your care team if you should be screened for diabetes.  Cholesterol test: At age 39, start having a cholesterol test every 5 years, or more often if advised.  Bone density scan (DEXA): At age 50, ask your care team if you should have this scan for osteoporosis (brittle bones).  Hepatitis C: Get tested at least once in your life.  STIs (sexually transmitted  infections)  Before age 24: Ask your care team if you should be screened for STIs.  After age 24: Get screened for STIs if you're at risk. You are at risk for STIs (including HIV) if:  You are sexually active with more than one person.  You don't use condoms every time.  You or a partner was diagnosed with a sexually transmitted infection.  If you are at risk for HIV, ask about PrEP medicine to prevent HIV.  Get tested for HIV at least once in your life, whether you are at risk for HIV or not.  Cancer screening tests  Cervical cancer screening: If you have a cervix, begin getting regular cervical cancer screening tests at age 21. Most people who have regular screenings with normal results can stop after age 65. Talk about this with your provider.  Breast cancer scan (mammogram): If you've ever had breasts, begin having regular mammograms starting at age 40. This is a scan to check for breast cancer.  Colon cancer screening: It is important to start screening for colon cancer at age 45.  Have a colonoscopy test every 10 years (or more often if you're at risk) Or, ask your provider about stool tests like a FIT test every year or Cologuard test every 3 years.  To learn more about your testing options, visit: https://[email or web address].pdf.  For help making a decision, visit: https://bit.ly/cf13985.  Prostate cancer screening test: If you have a prostate and are age 55 to 69, ask your provider if you would benefit from a yearly prostate cancer screening test.  Lung cancer screening: If you are a current or former smoker age 50 to 80, ask your care team if ongoing lung cancer screenings are right for you.  For informational purposes only. Not to replace the advice of your health care provider. Copyright   2023 [employer] All rights reserved. Clinically reviewed by the [facility] Transitions Program. [employer] [id number] - REV 01/24.

## 2024-03-08 NOTE — PROGRESS NOTES
"Preventive Care Visit  [facility] EDUARDO Arias MD, Internal Medicine  Mar 8, 2024    Assessment & Plan     Routine general medical examination at a health care facility  Routine health education discussed: calcium, diet, exercise, weight, safety.     Vitamin D deficiency  Continues with replacement  - DEXA HIP/PELVIS/SPINE - Future; Future    Class 3 severe obesity due to excess calories with serious comorbidity and body mass index (BMI) of 40.0 to 44.9 in adult (H)  On mounjaro, working on diet and exercisng    Type 2 diabetes mellitus without complication, without long-term current use of insulin (H)  Seeing elise, on [facility].  Will recheck labs before endo visit    Asymptomatic postmenopausal state    - DEXA HIP/PELVIS/SPINE - Future; Future                  BMI  Estimated body mass index is 43.77 kg/m  as calculated from the following:    Height as of this encounter: 1.618 m (5' 3.7\").    Weight as of this encounter: 114.6 kg (252 lb 9.6 oz).   Weight management plan: Patient referred to endocrine and/or weight management specialty    Counseling  Appropriate preventive services were discussed with this patient, including applicable screening as appropriate for fall prevention, nutrition, physical activity, Tobacco-use cessation, weight loss and cognition.  Checklist reviewing preventive services available has been given to the patient.  Reviewed patient's diet, addressing concerns and/or questions.   She is at risk for lack of exercise and has been provided with information to increase physical activity for the benefit of her well-being.       See Patient Instructions    Lang Lugo is a 63 year old, presenting for the following:  Physical        3/8/2024    11:12 AM   Additional Questions   Roomed by Milagro Hudson   Accompanied by N/A         3/8/2024    11:12 AM   Patient Reported Additional Medications   Patient reports taking the following new medications No        Via the " Health Maintenance questionnaire, the patient has reported the following services have been completed -Eye Exam, this information has been sent to the abstraction team.  Health Care Directive  Patient does not have a Health Care Directive or Living Will: Discussed advance care planning with patient; however, patient declined at this time.    HPI  Hypercholesterolemia - hasn't tolerated statin but talking with endo about it  Diabetes - seeing endo and started mounjaro and ramping up.  Taking one metformin a day.  Sugars are usually good.  Working from home and walking at lunch daily.        3/6/2024   General Health   How would you rate your overall physical health? Good   Feel stress (tense, anxious, or unable to sleep) Not at all         3/6/2024   Nutrition   Diet: Low salt    Diabetic         3/6/2024   Exercise   Days per week of moderate/strenous exercise 3 days   Average minutes spent exercising at this level 20 min         3/6/2024   Social Factors   Frequency of gathering with friends or relatives Twice a week   Worry food won't last until get money to buy more No   Food not last or not have enough money for food? No   Do you have housing?  Yes   Are you worried about losing your housing? No   Lack of transportation? No   Unable to get utilities (heat,electricity)? No         3/6/2024   Activities of Daily Living- Home Safety   Needs help with the following daily activites None of the above   Safety concerns in the home None of the above         3/6/2024   Dental   Dentist two times every year? Yes         3/6/2024   Hearing Screening   Hearing concerns? None of the above         3/6/2024   Driving Risk Screening   Patient/family members have concerns about driving No         3/6/2024   General Alertness/Fatigue Screening   Have you been more tired than usual lately? No         3/6/2024   Urinary Incontinence Screening   Bothered by leaking urine in past 6 months No         3/6/2024   TB Screening   Were you  born outside of [place]?  No           Today's PHQ-2 Score:       1/3/2024     7:46 AM   PHQ-2 ( 1999 Pfizer)   Q1: Little interest or pleasure in doing things 0   Q2: Feeling down, depressed or hopeless 0   PHQ-2 Score 0         3/6/2024   Substance Use   Alcohol more than 3/day or more than 7/wk No   Do you have a current opioid prescription? No   How severe/bad is pain from 1 to 10? 0/10 (No Pain)   Do you use any other substances recreationally? No     Social History     Tobacco Use    Smoking status: Never     Passive exposure: Never    Smokeless tobacco: Never   Vaping Use    Vaping Use: Never used   Substance Use Topics    Alcohol use: No    Drug use: No           9/25/2023   LAST FHS-7 RESULTS   1st degree relative breast or ovarian cancer Yes   Any relative bilateral breast cancer No   Any male have breast cancer No   Any ONE woman have BOTH breast AND ovarian cancer No   Any woman with breast cancer before 50yrs Yes   2 or more relatives with breast AND/OR ovarian cancer Yes   2 or more relatives with breast AND/OR bowel cancer Yes   Sister with breast cancer  Did genetic testing in [place] and negative  Mammogram Screening - Mammogram every 1-2 years updated in Health Maintenance based on mutual decision making      History of abnormal Pap smear: NO - age 30-65 PAP every 5 years with negative HPV co-testing recommended        Latest Ref Rng & Units 3/19/2021     9:08 AM 3/19/2021     8:05 AM   PAP / HPV   PAP (Historical)  NIL     HPV 16 DNA NEG^Negative  Negative    HPV 18 DNA NEG^Negative  Negative    Other HR HPV NEG^Negative  Negative      ASCVD Risk   The 10-year ASCVD risk score (Hector MERLOS, et al., 2019) is: 10.8%    Values used to calculate the score:      Age: 63 years      Sex: Female      Is Non- : No      Diabetic: Yes      Tobacco smoker: No      Systolic Blood Pressure: 130 mmHg      Is BP treated: No      HDL Cholesterol: 51 mg/dL      Total Cholesterol: 188  "mg/dL            Reviewed and updated as needed this visit by Provider     Meds     Fam Hx            Labs reviewed in EPIC  Current providers sharing in care for this patient include:  Patient Care Team:  Bianca Arias MD as PCP - General (Internal Medicine)  Bianca Arias MD as Assigned PCP  Susannah Park PA-C as Physician Assistant (Endocrinology, Diabetes, and Metabolism)  Norma Feliciano, RN as Diabetes Educator (Diabetes Education)  Susannah Park PA-C as Assigned Endocrinology Provider    The following health maintenance items are reviewed in Epic and correct as of today:  Health Maintenance   Topic Date Due    DEXA  Never done    MICROALBUMIN  01/31/2024    EYE EXAM  03/10/2024    A1C  04/02/2024    BMP  07/02/2024    MAMMO SCREENING  09/25/2024    LIPID  09/27/2024    DIABETIC FOOT EXAM  10/03/2024    MEDICARE ANNUAL WELLNESS VISIT  03/08/2025    ANNUAL REVIEW OF HM ORDERS  03/08/2025    FALL RISK ASSESSMENT  03/08/2025    ADVANCE CARE PLANNING  03/21/2026    COLORECTAL CANCER SCREENING  07/11/2029    DTAP/TDAP/TD IMMUNIZATION (3 - Td or Tdap) 09/16/2030    HEPATITIS C SCREENING  Completed    PHQ-2 (once per calendar year)  Completed    PAP FOLLOW-UP  Completed    HPV FOLLOW-UP  Completed    INFLUENZA VACCINE  Completed    Pneumococcal Vaccine: 65+ Years  Completed    ZOSTER IMMUNIZATION  Completed    RSV VACCINE (Pregnancy & 60+)  Completed    COVID-19 Vaccine  Completed    IPV IMMUNIZATION  Aged Out    HPV IMMUNIZATION  Aged Out    MENINGITIS IMMUNIZATION  Aged Out    RSV MONOCLONAL ANTIBODY  Aged Out    HIV SCREENING  Discontinued    PAP  Discontinued            Objective    Exam  /84 (BP Location: Right arm, Patient Position: Sitting, Cuff Size: Adult Large)   Pulse 89   Temp 97  F (36.1  C) (Tympanic)   Resp 20   Ht 1.618 m (5' 3.7\")   Wt 114.6 kg (252 lb 9.6 oz)   LMP 01/25/2009   SpO2 97%   BMI 43.77 kg/m     Estimated body mass index is 43.77 kg/m  as calculated from the " "following:    Height as of this encounter: 1.618 m (5' 3.7\").    Weight as of this encounter: 114.6 kg (252 lb 9.6 oz).    Physical Exam  GENERAL: alert and no distress  EYES: Eyes grossly normal to inspection, PERRL and conjunctivae and sclerae normal  HENT: ear canals and TM's normal, nose and mouth without ulcers or lesions  NECK: no adenopathy, no asymmetry, masses, or scars  RESP: lungs clear to auscultation - no rales, rhonchi or wheezes  CV: regular rate and rhythm, normal S1 S2, no S3 or S4, no murmur, click or rub, no peripheral edema  ABDOMEN: soft, nontender, no hepatosplenomegaly, no masses and bowel sounds normal  MS: no gross musculoskeletal defects noted, no edema  SKIN: no suspicious lesions or rashes  NEURO: Normal strength and tone, mentation intact and speech normal  PSYCH: mentation appears normal, affect normal/bright        3/8/2024   Mini Cog   Clock Draw Score 2 Normal   3 Item Recall 3 objects recalled   Mini Cog Total Score 5           Signed Electronically by: Bianca Arias MD    "

## 2024-03-21 DIAGNOSIS — E11.9 TYPE 2 DIABETES MELLITUS WITHOUT COMPLICATION, WITHOUT LONG-TERM CURRENT USE OF INSULIN (H): Primary | ICD-10-CM

## 2024-03-21 RX ORDER — TIRZEPATIDE 12.5 MG/.5ML
12.5 INJECTION, SOLUTION SUBCUTANEOUS
Qty: 2 ML | Refills: 2 | Status: SHIPPED | OUTPATIENT
Start: 2024-03-21 | End: 2024-07-11 | Stop reason: DRUGHIGH

## 2024-06-01 ENCOUNTER — HEALTH MAINTENANCE LETTER (OUTPATIENT)
Age: 64
End: 2024-06-01

## 2024-06-17 DIAGNOSIS — E11.9 TYPE 2 DIABETES MELLITUS WITHOUT COMPLICATION, WITHOUT LONG-TERM CURRENT USE OF INSULIN (H): ICD-10-CM

## 2024-06-25 ENCOUNTER — LAB (OUTPATIENT)
Dept: LAB | Facility: CLINIC | Age: 64
End: 2024-06-25
Payer: COMMERCIAL

## 2024-06-25 DIAGNOSIS — E78.2 MIXED HYPERLIPIDEMIA: ICD-10-CM

## 2024-06-25 DIAGNOSIS — E11.9 TYPE 2 DIABETES MELLITUS WITHOUT COMPLICATION, WITHOUT LONG-TERM CURRENT USE OF INSULIN (H): ICD-10-CM

## 2024-06-25 DIAGNOSIS — E11.9 DIABETES MELLITUS, TYPE 2 (H): Primary | ICD-10-CM

## 2024-06-25 DIAGNOSIS — E55.9 VITAMIN D DEFICIENCY: ICD-10-CM

## 2024-06-25 LAB
ANION GAP SERPL CALCULATED.3IONS-SCNC: 11 MMOL/L (ref 7–15)
BUN SERPL-MCNC: 13.2 MG/DL (ref 8–23)
CALCIUM SERPL-MCNC: 9.4 MG/DL (ref 8.8–10.2)
CHLORIDE SERPL-SCNC: 103 MMOL/L (ref 98–107)
CHOLEST SERPL-MCNC: 205 MG/DL
CREAT SERPL-MCNC: 0.92 MG/DL (ref 0.51–0.95)
CREAT UR-MCNC: 152 MG/DL
DEPRECATED HCO3 PLAS-SCNC: 25 MMOL/L (ref 22–29)
EGFRCR SERPLBLD CKD-EPI 2021: 69 ML/MIN/1.73M2
FASTING STATUS PATIENT QL REPORTED: ABNORMAL
FASTING STATUS PATIENT QL REPORTED: ABNORMAL
GLUCOSE SERPL-MCNC: 105 MG/DL (ref 70–99)
HBA1C MFR BLD: 6 % (ref 0–5.6)
HDLC SERPL-MCNC: 53 MG/DL
LDLC SERPL CALC-MCNC: 125 MG/DL
MICROALBUMIN UR-MCNC: <12 MG/L
MICROALBUMIN/CREAT UR: NORMAL MG/G{CREAT}
NONHDLC SERPL-MCNC: 152 MG/DL
POTASSIUM SERPL-SCNC: 4.5 MMOL/L (ref 3.4–5.3)
SODIUM SERPL-SCNC: 139 MMOL/L (ref 135–145)
TRIGL SERPL-MCNC: 134 MG/DL
TSH SERPL DL<=0.005 MIU/L-ACNC: 1.78 UIU/ML (ref 0.3–4.2)
VIT D+METAB SERPL-MCNC: 40 NG/ML (ref 20–50)

## 2024-06-25 PROCEDURE — 83036 HEMOGLOBIN GLYCOSYLATED A1C: CPT

## 2024-06-25 PROCEDURE — 82306 VITAMIN D 25 HYDROXY: CPT

## 2024-06-25 PROCEDURE — 36415 COLL VENOUS BLD VENIPUNCTURE: CPT

## 2024-06-25 PROCEDURE — 80061 LIPID PANEL: CPT

## 2024-06-25 PROCEDURE — 82043 UR ALBUMIN QUANTITATIVE: CPT

## 2024-06-25 PROCEDURE — 84443 ASSAY THYROID STIM HORMONE: CPT

## 2024-06-25 PROCEDURE — 80048 BASIC METABOLIC PNL TOTAL CA: CPT

## 2024-06-25 PROCEDURE — 82570 ASSAY OF URINE CREATININE: CPT

## 2024-06-25 RX ORDER — LANCETS 33 GAUGE
EACH MISCELLANEOUS
Qty: 100 EACH | Refills: 2 | Status: SHIPPED | OUTPATIENT
Start: 2024-06-25

## 2024-06-25 NOTE — TELEPHONE ENCOUNTER
LVD:  1/3/2024  [facility] Susannah Leslie PA-C  Endocrinology, Diabetes, and Metabolism     Refilled per protocol.

## 2024-07-08 ENCOUNTER — MYC MEDICAL ADVICE (OUTPATIENT)
Dept: ENDOCRINOLOGY | Facility: CLINIC | Age: 64
End: 2024-07-08
Payer: COMMERCIAL

## 2024-07-08 ENCOUNTER — MYC REFILL (OUTPATIENT)
Dept: ENDOCRINOLOGY | Facility: CLINIC | Age: 64
End: 2024-07-08
Payer: COMMERCIAL

## 2024-07-08 DIAGNOSIS — E11.9 TYPE 2 DIABETES MELLITUS WITHOUT COMPLICATION, WITHOUT LONG-TERM CURRENT USE OF INSULIN (H): ICD-10-CM

## 2024-07-08 RX ORDER — TIRZEPATIDE 12.5 MG/.5ML
12.5 INJECTION, SOLUTION SUBCUTANEOUS
Qty: 2 ML | Refills: 2 | OUTPATIENT
Start: 2024-07-08

## 2024-07-09 NOTE — PROGRESS NOTES
8/9/2024 8:41 am 107mg after food  8/8/2024 6:10 PM 85 mg BEFORE FOOD  8/7/2024 8:46 AM 92  8/6/2024 10:12 AM 96mg  8/5/2024 5:38 86 mg  8/4/2024 6:30 94mg  8/3/2024 6:37 97 mg  8/1/2024 7:28AM 94 mg  Outcome for 07/09/24 3:02 PM: Replied to Norishart octavio Bland CMA  Adult Endocrinology   [facility]

## 2024-07-11 ENCOUNTER — OFFICE VISIT (OUTPATIENT)
Dept: ENDOCRINOLOGY | Facility: CLINIC | Age: 64
End: 2024-07-11
Payer: COMMERCIAL

## 2024-07-11 VITALS
OXYGEN SATURATION: 99 % | RESPIRATION RATE: 20 BRPM | SYSTOLIC BLOOD PRESSURE: 140 MMHG | WEIGHT: 241 LBS | DIASTOLIC BLOOD PRESSURE: 88 MMHG | HEART RATE: 83 BPM | BODY MASS INDEX: 41.76 KG/M2

## 2024-07-11 DIAGNOSIS — E78.2 MIXED HYPERLIPIDEMIA: Primary | ICD-10-CM

## 2024-07-11 DIAGNOSIS — E66.01 CLASS 3 SEVERE OBESITY DUE TO EXCESS CALORIES WITH SERIOUS COMORBIDITY AND BODY MASS INDEX (BMI) OF 45.0 TO 49.9 IN ADULT (H): ICD-10-CM

## 2024-07-11 DIAGNOSIS — E66.813 CLASS 3 SEVERE OBESITY DUE TO EXCESS CALORIES WITH SERIOUS COMORBIDITY AND BODY MASS INDEX (BMI) OF 45.0 TO 49.9 IN ADULT (H): ICD-10-CM

## 2024-07-11 DIAGNOSIS — E11.9 TYPE 2 DIABETES MELLITUS WITHOUT COMPLICATION, WITHOUT LONG-TERM CURRENT USE OF INSULIN (H): ICD-10-CM

## 2024-07-11 PROCEDURE — 99214 OFFICE O/P EST MOD 30 MIN: CPT | Performed by: PHYSICIAN ASSISTANT

## 2024-07-11 RX ORDER — METFORMIN HCL 500 MG
500 TABLET, EXTENDED RELEASE 24 HR ORAL
Qty: 90 TABLET | Refills: 1 | Status: SHIPPED | OUTPATIENT
Start: 2024-07-11

## 2024-07-11 NOTE — PATIENT INSTRUCTIONS
Welcome to the [facility] Endocrinology and Diabetes Clinics     Our Endocrinology Clinics are here to provide you with a team-based, collaborative approach in the diagnosis and treatment of patients with diabetes and endocrine disorders. The team is made up of Physicians, Physician Assistants, Certified Diabetes Educators, Registered Nurses, Medical Assistants, Emergency Medical Technicians, and many others, all of whom have the unified goal of providing our patients with high quality care.     Please see below for some helpful tips to best navigate and use the [facility] Endocrinology clinic:     [facility] Respect: At [facility], we are committed to a respectful and safe space for all patients, visitors, and staff.  We believe that mutual respect between patients and their care team is the foundation of quality care.  It is our expectation that you will be treated with respect by your care team.  In turn, we ask that all communication with the care team (written and verbal) be respectful and free from profanity, threatening, or abusive language.  Disrespectful communication undermines our therapeutic relationship with you and may result in us being unable to continue to provide your care.    Refills: A provider must see you at least annually to prescribe and refill medications. This is to ensure your safety as well as meet insurance and compliance regulations.    Scheduling: Many of our Providers offer both in-person or video visits. Please call to schedule any needed follow ups as soon as possible because our provider schedules fill up very quickly. Our care team has the right to require an in-person visit when they believe that it is medically necessary. Please remember that for any virtual visits, you must be in the [facility] at the time of the visit, otherwise we are unable to see you and you will need to be rescheduled.    Missed Appointments: If you need to cancel or miss your  scheduled appointment, please call the clinic at [phone] to reschedule.  Please note if you repeatedly miss appointments or repeatedly miss appointments without calling to inform us ahead of time (no-show), the clinic may elect to not allow you to reschedule without speaking to a manager, may require a Partnership In Care Agreement prior to rescheduling, or could result in you no longer being able to receive care from the clinic. Providing the clinic with timely notification if you have to miss an appointment, allows us to better serve the needs of all of our patients.    Primary Care Provider: Our Endocrinologists are Specialists in their field. We expect you to have a Primary Care Provider established to handle any needs outside of your diabetes and endocrine care.  We would be happy to assist you find a Primary Care Provider, if you do not have one.    Simple [employer]: Simple [employer] is a wonderful resource that allows you access to your Care Team via online or the matthias. Please ask a member of the team if you would like help creating an account. Please note that it may take up to 2 business days for a response. [employer] messages are not reviewed on weekends or after business hours.  Emergent or urgent care needs should never be communicated via Simple [employer].  If you experience a medical emergency call 911 or go to the nearest emergency room.    Labs: It is recommended that you stay within the [facility] for labs but you are welcome to obtain ordered labs (with some exceptions) from any location of your choice as long as they are able to complete and process the needed labs. If you need us to fax orders to your preferred lab, please provide us the name and fax number of the lab you would like to go to so we can fax the orders. If your labs are drawn outside of the [facility], please have them fax the results to [phone] ([place]) or [phone] ([place]) or via [facility]. It is your  responsibility to ensure that outside lab results are sent to us.    We look forward to working with you. Please do not hesitate to reach out with any questions.    Thank you,    The Endocrine Team    [facility] Address:   [place] Address:     [address] [place] [address]    Phone: [phone]  Fax: [phone] [address] [place] [address]    Phone: [phone]  Fax: [phone]     [facility] Cost Estimate Phone Number: [phone]    General Lab and Imaging Scheduling Phone Number: [phone]

## 2024-07-11 NOTE — PROGRESS NOTES
Assessment/Plan :   Type 2 DM. Parish is doing well. Her blood sugars are very stable and she has continued to lose weight on Mounjaro therapy. We reviewed her recent laboratory results and I encouraged her to keep up the good work. We will increase Mounjaro to 15 mg weekly. We will follow-up in 6 mos.  Hyperlipidemia. Her LDL is elevated. I understand her concern regarding statin therapy but she is also at risk for heart attack and stroke. She would like to continue to work on diet and see if the LDL improves. We will repeat laboratory testing in follow-up.       I have independently reviewed and interpreted labs, imaging as indicated.      Chief complaint:  Vani is a 63 year old female who returns for follow-up of Type 2 DM.    I have reviewed Care Everywhere including [facility], [facility], [facility], [facility] , [facility], [place] lab reports, imaging reports and provider notes as indicated.      HISTORY OF PRESENT ILLNESS  Parish is doing well. She continues to take metformin  mg with dinner. She also takes 12.5 mg of Mounjaro weekly. She feels like the weight loss is starting to slow with her current dose of Mounjaro and she would like to increase to 15 mg, if possible. She has not had any adverse effects from the medication and she is feeling good. She has also continued to work on eating healthy and being active.    Parish has not had any problems with severe hyperglycemia and/or hypoglycemia. She also has not noticed any changes to her vision or worsening numbness/tingling in her feet. She continues to work about her cholesterol. She works for the [employer] and she knows that her LDL is a little elevated. She also worries about the possible adverse effects associated with statin therapy. She has continued to monitor the levels with her primary care provider.    Parish was diagnosed with pre-diabetes about 6 yrs ago. She was started on metformin  at that time and she worked hard on making healthy lifestyle changes. She was able to discontinue the metformin around 2018 due to improvement in her blood sugars. Unfortunately, in 2019, she started to regain the weight that she lost and her hemoglobin A1C creeped up. She was restarted on metformin in 2022. This seemed to work well, until recently. She started to develop more GI side effects with the metformin and her weight loss slowed. We started her on Mounjaro in October of 2023 and have increased the dose monthly. Her diabetes is complicated by obesity, JORGE, and hyperlipidemia.    Endocrine relevant labs are as follows:   Latest Reference Range & Units 06/25/24 07:47   Hemoglobin A1C 0.0 - 5.6 % 6.0 (H)   (H): Data is abnormally high   Latest Reference Range & Units 06/25/24 07:47   Albumin Urine mg/L mg/L <12.0      Latest Reference Range & Units 01/02/24 09:48   Hemoglobin A1C 0.0 - 5.6 % 5.8 (H)   (H): Data is abnormally high    REVIEW OF SYSTEMS    Endocrine: positive for diabetes and obesity  Skin: negative  Eyes: negative for, visual blurring, redness, tearing  Ears/Nose/Throat: negative  Respiratory: No shortness of breath, dyspnea on exertion, cough, or hemoptysis  Cardiovascular: negative for, chest pain, dyspnea on exertion, lower extremity edema, and exercise intolerance  Gastrointestinal: negative for, nausea, vomiting, constipation, and diarrhea  Genitourinary: negative for, nocturia, dysuria, frequency, and urgency  Musculoskeletal: negative for, muscular weakness, nocturnal cramping, and foot pain  Neurologic: negative for, local weakness, numbness or tingling of hands, and numbness or tingling of feet  Psychiatric: negative  Hematologic/Lymphatic/Immunologic: negative    Past Medical History  Past Medical History:   Diagnosis Date    Chest pain, unspecified 06/2006    neg stress test in [facility]    Diabetes mellitus, type 2 (H) 06/2019    Gestational diabetes mellitus, delivered 04/25/2008     Obesity, unspecified     Thyroid nodule 2018       Medications  Current Outpatient Medications   Medication Sig Dispense Refill    aspirin 81 MG EC tablet Take 81 mg by mouth daily      blood glucose (ONETOUCH ULTRA) test strip Use to test blood sugar 3 times daily or as directed. 300 strip 0    blood glucose monitoring (ONETOUCH VERIO) meter device kit Use to test blood sugar 3 times daily or as directed. 1 kit 0    Cholecalciferol (VITAMIN D-3 PO) Take 2,000 Units by mouth daily      ciclopirox (PENLAC) 8 % external solution 1 APPLICATION DAILY TOPICALLY APPLY TO NAIL DAILY.      Continuous Blood Gluc Sensor (FREESTYLE THONY 2 SENSOR) MISC 1 each every 14 days 2 each 11    fluocinonide (LIDEX) 0.05 % external gel       Lancets (ONETOUCH DELICA PLUS [id number]) MISC USE ONCE DAILY AS DIRECTED. 100 each 2    metFORMIN (GLUCOPHAGE XR) 500 MG 24 hr tablet Take 4 tablets (2,000 mg) by mouth daily (with dinner) (Patient taking differently: Take 500 mg by mouth daily (with dinner)) 360 tablet 0    multivitamin (CENTRUM SILVER) tablet Take 1 tablet by mouth daily      tirzepatide (MOUNJARO) 15 MG/0.5ML pen Inject 15 mg Subcutaneous every 7 days 2 mL 2    triamcinolone (KENALOG) 0.1 % external cream Apply topically 2 times daily 30 g 3       Allergies  Allergies   Allergen Reactions    Sulfa Antibiotics Anaphylaxis    Dust Mites          Family History  family history includes Breast Cancer in her maternal aunt and maternal cousin; Breast Cancer (age of onset: 32) in her sister; Cancer - colorectal in her father; Cerebrovascular Disease in her mother; Cowden Syndrome in her brother and sister; Diabetes in her mother and other family members; Hypertension in her mother; Lipids in her mother; Prostate Cancer in her brother; Skin Cancer in her brother; Thyroid Cancer in her brother; Thyroid Disease in her brother and mother.    Social History  Social History     Tobacco Use    Smoking status: Never     Passive exposure: Never     Smokeless tobacco: Never   Vaping Use    Vaping status: Never Used   Substance Use Topics    Alcohol use: No    Drug use: No       Physical Exam  BP (!) 140/88 (BP Location: Left arm, Patient Position: Sitting, Cuff Size: Adult Large)   Pulse 83   Resp 20   Wt 109.3 kg (241 lb)   LMP 01/25/2009   SpO2 99%   BMI 41.76 kg/m    Body mass index is 41.76 kg/m .  GENERAL :  In no apparent distress  SKIN: Normal color, normal temperature, texture.  No hirsutism, alopecia or purple striae.     EYES: PERRL, EOMI, No scleral icterus,  No proptosis, conjunctival redness, stare, retraction  RESP: Lungs clear to auscultation bilaterally  CARDIAC: Regular rate and rhythm, normal S1 S2, without murmurs, rubs or gallops  NEURO: awake, alert, responds appropriately to questions.  Cranial nerves intact.   Moves all extremities; Gait normal.  No tremor of the outstretched hand.    EXTREMITIES: No clubbing, cyanosis or edema.    DATA REVIEW  Please see attached glucose log  Current Ave BG 94 mg/dl

## 2024-07-11 NOTE — LETTER
7/11/2024      Vani Daniel  [address] [facility] [address]      Dear Colleague,    Thank you for referring your patient, Vani Daniel, to the [facility]. Please see a copy of my visit note below.    8/9/2024 8:41 am 107mg after food  8/8/2024 6:10 PM 85 mg BEFORE FOOD  8/7/2024 8:46 AM 92  8/6/2024 10:12 AM 96mg  8/5/2024 5:38 86 mg  8/4/2024 6:30 94mg  8/3/2024 6:37 97 mg  8/1/2024 7:28AM 94 mg  Outcome for 07/09/24 3:02 PM: Replied to Mychart octavio Bland, [facility]  Adult Endocrinology   [facility]        Assessment/Plan :   Type 2 DM. Parish is doing well. Her blood sugars are very stable and she has continued to lose weight on Mounjaro therapy. We reviewed her recent laboratory results and I encouraged her to keep up the good work. We will increase Mounjaro to 15 mg weekly. We will follow-up in 6 mos.  Hyperlipidemia. Her LDL is elevated. I understand her concern regarding statin therapy but she is also at risk for heart attack and stroke. She would like to continue to work on diet and see if the LDL improves. We will repeat laboratory testing in follow-up.       I have independently reviewed and interpreted labs, imaging as indicated.      Chief complaint:  Vani is a 63 year old female who returns for follow-up of Type 2 DM.    I have reviewed Care Everywhere including [facility], [facility], [facility], [facility], [facility] , [facility], [place] lab reports, imaging reports and provider notes as indicated.      HISTORY OF PRESENT ILLNESS  Parish is doing well. She continues to take metformin  mg with dinner. She also takes 12.5 mg of Mounjaro weekly. She feels like the weight loss is starting to slow with her current dose of Mounjaro and she would like to increase to 15 mg, if possible. She has not had any adverse effects from the medication and she is feeling good. She has also continued to  work on eating healthy and being active.    Parish has not had any problems with severe hyperglycemia and/or hypoglycemia. She also has not noticed any changes to her vision or worsening numbness/tingling in her feet. She continues to work about her cholesterol. She works for the [employer] and she knows that her LDL is a little elevated. She also worries about the possible adverse effects associated with statin therapy. She has continued to monitor the levels with her primary care provider.    Parish was diagnosed with pre-diabetes about 6 yrs ago. She was started on metformin at that time and she worked hard on making healthy lifestyle changes. She was able to discontinue the metformin around 2018 due to improvement in her blood sugars. Unfortunately, in 2019, she started to regain the weight that she lost and her hemoglobin A1C creeped up. She was restarted on metformin in 2022. This seemed to work well, until recently. She started to develop more GI side effects with the metformin and her weight loss slowed. We started her on Mounjaro in October of 2023 and have increased the dose monthly. Her diabetes is complicated by obesity, JORGE, and hyperlipidemia.    Endocrine relevant labs are as follows:   Latest Reference Range & Units 06/25/24 07:47   Hemoglobin A1C 0.0 - 5.6 % 6.0 (H)   (H): Data is abnormally high   Latest Reference Range & Units 06/25/24 07:47   Albumin Urine mg/L mg/L <12.0      Latest Reference Range & Units 01/02/24 09:48   Hemoglobin A1C 0.0 - 5.6 % 5.8 (H)   (H): Data is abnormally high    REVIEW OF SYSTEMS    Endocrine: positive for diabetes and obesity  Skin: negative  Eyes: negative for, visual blurring, redness, tearing  Ears/Nose/Throat: negative  Respiratory: No shortness of breath, dyspnea on exertion, cough, or hemoptysis  Cardiovascular: negative for, chest pain, dyspnea on exertion, lower extremity edema, and exercise intolerance  Gastrointestinal: negative for, nausea,  vomiting, constipation, and diarrhea  Genitourinary: negative for, nocturia, dysuria, frequency, and urgency  Musculoskeletal: negative for, muscular weakness, nocturnal cramping, and foot pain  Neurologic: negative for, local weakness, numbness or tingling of hands, and numbness or tingling of feet  Psychiatric: negative  Hematologic/Lymphatic/Immunologic: negative    Past Medical History  Past Medical History:   Diagnosis Date     Chest pain, unspecified 06/2006    neg stress test in [facility]     Diabetes mellitus, type 2 (H) 06/2019     Gestational diabetes mellitus, delivered 04/25/2008     Obesity, unspecified      Thyroid nodule 2018       Medications  Current Outpatient Medications   Medication Sig Dispense Refill     aspirin 81 MG EC tablet Take 81 mg by mouth daily       blood glucose (ONETOUCH ULTRA) test strip Use to test blood sugar 3 times daily or as directed. 300 strip 0     blood glucose monitoring (ONETOUCH VERIO) meter device kit Use to test blood sugar 3 times daily or as directed. 1 kit 0     Cholecalciferol (VITAMIN D-3 PO) Take 2,000 Units by mouth daily       ciclopirox (PENLAC) 8 % external solution 1 APPLICATION DAILY TOPICALLY APPLY TO NAIL DAILY.       Continuous Blood Gluc Sensor (FREESTYLE THONY 2 SENSOR) MISC 1 each every 14 days 2 each 11     fluocinonide (LIDEX) 0.05 % external gel        Lancets (ONETOUCH DELICA PLUS [id number]) MISC USE ONCE DAILY AS DIRECTED. 100 each 2     metFORMIN (GLUCOPHAGE XR) 500 MG 24 hr tablet Take 4 tablets (2,000 mg) by mouth daily (with dinner) (Patient taking differently: Take 500 mg by mouth daily (with dinner)) 360 tablet 0     multivitamin (CENTRUM SILVER) tablet Take 1 tablet by mouth daily       tirzepatide (MOUNJARO) 15 MG/0.5ML pen Inject 15 mg Subcutaneous every 7 days 2 mL 2     triamcinolone (KENALOG) 0.1 % external cream Apply topically 2 times daily 30 g 3       Allergies  Allergies   Allergen Reactions     Sulfa Antibiotics Anaphylaxis     Dust  Mites          Family History  family history includes Breast Cancer in her maternal aunt and maternal cousin; Breast Cancer (age of onset: 32) in her sister; Cancer - colorectal in her father; Cerebrovascular Disease in her mother; Cowden Syndrome in her brother and sister; Diabetes in her mother and other family members; Hypertension in her mother; Lipids in her mother; Prostate Cancer in her brother; Skin Cancer in her brother; Thyroid Cancer in her brother; Thyroid Disease in her brother and mother.    Social History  Social History     Tobacco Use     Smoking status: Never     Passive exposure: Never     Smokeless tobacco: Never   Vaping Use     Vaping status: Never Used   Substance Use Topics     Alcohol use: No     Drug use: No       Physical Exam  BP (!) 140/88 (BP Location: Left arm, Patient Position: Sitting, Cuff Size: Adult Large)   Pulse 83   Resp 20   Wt 109.3 kg (241 lb)   LMP 01/25/2009   SpO2 99%   BMI 41.76 kg/m    Body mass index is 41.76 kg/m .  GENERAL :  In no apparent distress  SKIN: Normal color, normal temperature, texture.  No hirsutism, alopecia or purple striae.     EYES: PERRL, EOMI, No scleral icterus,  No proptosis, conjunctival redness, stare, retraction  RESP: Lungs clear to auscultation bilaterally  CARDIAC: Regular rate and rhythm, normal S1 S2, without murmurs, rubs or gallops  NEURO: awake, alert, responds appropriately to questions.  Cranial nerves intact.   Moves all extremities; Gait normal.  No tremor of the outstretched hand.    EXTREMITIES: No clubbing, cyanosis or edema.    DATA REVIEW  Please see attached glucose log  Current Ave BG 94 mg/dl        Again, thank you for allowing me to participate in the care of your patient.        Sincerely,        Susannah Park PA-C

## 2024-07-11 NOTE — NURSING NOTE
Vani Daniel's goals for this visit include:   Chief Complaint   Patient presents with    Follow Up     DMII       She requests these members of her care team be copied on today's visit information: yes    PCP: Bianca Arias    Referring Provider:  Referred Self, MD  No address on file    BP (!) 140/88 (BP Location: Left arm, Patient Position: Sitting, Cuff Size: Adult Large)   Pulse 83   Resp 20   Wt 109.3 kg (241 lb)   LMP 01/25/2009   SpO2 99%   BMI 41.76 kg/m      Do you need any medication refills at today's visit? Yes    Milo Caro, EMT       Oriented - self; Oriented - place; Oriented - time

## 2024-09-24 ENCOUNTER — MYC REFILL (OUTPATIENT)
Dept: ENDOCRINOLOGY | Facility: CLINIC | Age: 64
End: 2024-09-24
Payer: COMMERCIAL

## 2024-09-24 DIAGNOSIS — E11.9 TYPE 2 DIABETES MELLITUS WITHOUT COMPLICATION, WITHOUT LONG-TERM CURRENT USE OF INSULIN (H): ICD-10-CM

## 2024-10-02 ENCOUNTER — HOSPITAL ENCOUNTER (OUTPATIENT)
Dept: MAMMOGRAPHY | Facility: CLINIC | Age: 64
Discharge: HOME | End: 2024-10-02
Attending: INTERNAL MEDICINE | Admitting: INTERNAL MEDICINE
Payer: COMMERCIAL

## 2024-10-02 DIAGNOSIS — Z12.31 VISIT FOR SCREENING MAMMOGRAM: ICD-10-CM

## 2024-10-02 PROCEDURE — 77063 BREAST TOMOSYNTHESIS BI: CPT

## 2024-10-05 ENCOUNTER — IMMUNIZATION (OUTPATIENT)
Dept: FAMILY MEDICINE | Facility: CLINIC | Age: 64
End: 2024-10-05
Payer: COMMERCIAL

## 2024-10-05 PROCEDURE — 90662 IIV NO PRSV INCREASED AG IM: CPT

## 2024-10-05 PROCEDURE — 90471 IMMUNIZATION ADMIN: CPT

## 2024-10-05 PROCEDURE — 90480 ADMN SARSCOV2 VAC 1/ONLY CMP: CPT

## 2024-10-05 PROCEDURE — 91320 SARSCV2 VAC 30MCG TRS-SUC IM: CPT

## 2024-10-19 ENCOUNTER — HEALTH MAINTENANCE LETTER (OUTPATIENT)
Age: 64
End: 2024-10-19

## 2024-12-16 ENCOUNTER — LAB (OUTPATIENT)
Dept: LAB | Facility: CLINIC | Age: 64
End: 2024-12-16
Payer: COMMERCIAL

## 2024-12-16 DIAGNOSIS — E11.9 DIABETES MELLITUS, TYPE 2 (H): Primary | ICD-10-CM

## 2024-12-16 LAB
ANION GAP SERPL CALCULATED.3IONS-SCNC: 10 MMOL/L (ref 7–15)
BUN SERPL-MCNC: 14.6 MG/DL (ref 8–23)
CALCIUM SERPL-MCNC: 9.4 MG/DL (ref 8.8–10.4)
CHLORIDE SERPL-SCNC: 105 MMOL/L (ref 98–107)
CREAT SERPL-MCNC: 1.03 MG/DL (ref 0.51–0.95)
EGFRCR SERPLBLD CKD-EPI 2021: 60 ML/MIN/1.73M2
EST. AVERAGE GLUCOSE BLD GHB EST-MCNC: 111 MG/DL
GLUCOSE SERPL-MCNC: 99 MG/DL (ref 70–99)
HBA1C MFR BLD: 5.5 % (ref 0–5.6)
HCO3 SERPL-SCNC: 26 MMOL/L (ref 22–29)
POTASSIUM SERPL-SCNC: 3.9 MMOL/L (ref 3.4–5.3)
SODIUM SERPL-SCNC: 141 MMOL/L (ref 135–145)

## 2024-12-16 PROCEDURE — 83036 HEMOGLOBIN GLYCOSYLATED A1C: CPT

## 2024-12-16 PROCEDURE — 36415 COLL VENOUS BLD VENIPUNCTURE: CPT

## 2024-12-16 PROCEDURE — 80048 BASIC METABOLIC PNL TOTAL CA: CPT

## 2024-12-20 DIAGNOSIS — E11.9 TYPE 2 DIABETES MELLITUS WITHOUT COMPLICATION, WITHOUT LONG-TERM CURRENT USE OF INSULIN (H): ICD-10-CM

## 2024-12-23 RX ORDER — TIRZEPATIDE 15 MG/.5ML
INJECTION, SOLUTION SUBCUTANEOUS
Qty: 2 ML | Refills: 1 | Status: SHIPPED | OUTPATIENT
Start: 2024-12-23

## 2024-12-23 NOTE — TELEPHONE ENCOUNTER
Patient sent a message stating this refill is needed asap.    Estuardol forward to the CDE pool to advise on refill.    Marci Machuca CMA  Adult Endocrinology  [facility] [place]

## 2025-01-13 ENCOUNTER — MYC REFILL (OUTPATIENT)
Dept: ENDOCRINOLOGY | Facility: CLINIC | Age: 65
End: 2025-01-13

## 2025-01-13 DIAGNOSIS — E11.9 TYPE 2 DIABETES MELLITUS WITHOUT COMPLICATION, WITHOUT LONG-TERM CURRENT USE OF INSULIN (H): ICD-10-CM

## 2025-01-14 RX ORDER — METFORMIN HYDROCHLORIDE 500 MG/1
500 TABLET, EXTENDED RELEASE ORAL
Qty: 90 TABLET | Refills: 1 | Status: SHIPPED | OUTPATIENT
Start: 2025-01-14

## 2025-02-18 ENCOUNTER — MYC REFILL (OUTPATIENT)
Dept: ENDOCRINOLOGY | Facility: CLINIC | Age: 65
End: 2025-02-18
Payer: COMMERCIAL

## 2025-02-18 DIAGNOSIS — E11.9 TYPE 2 DIABETES MELLITUS WITHOUT COMPLICATION, WITHOUT LONG-TERM CURRENT USE OF INSULIN (H): ICD-10-CM

## 2025-02-18 RX ORDER — TIRZEPATIDE 15 MG/.5ML
15 INJECTION, SOLUTION SUBCUTANEOUS
Qty: 2 ML | Refills: 1 | Status: SHIPPED | OUTPATIENT
Start: 2025-02-18

## 2025-04-09 ENCOUNTER — TRANSFERRED RECORDS (OUTPATIENT)
Dept: HEALTH INFORMATION MANAGEMENT | Facility: CLINIC | Age: 65
End: 2025-04-09
Payer: COMMERCIAL

## 2025-04-14 ENCOUNTER — MYC REFILL (OUTPATIENT)
Dept: ENDOCRINOLOGY | Facility: CLINIC | Age: 65
End: 2025-04-14
Payer: COMMERCIAL

## 2025-04-14 DIAGNOSIS — E11.9 TYPE 2 DIABETES MELLITUS WITHOUT COMPLICATION, WITHOUT LONG-TERM CURRENT USE OF INSULIN (H): ICD-10-CM

## 2025-04-14 RX ORDER — BLOOD SUGAR DIAGNOSTIC
STRIP MISCELLANEOUS
Qty: 300 STRIP | Refills: 0 | Status: SHIPPED | OUTPATIENT
Start: 2025-04-14

## 2025-04-15 ENCOUNTER — MYC REFILL (OUTPATIENT)
Dept: ENDOCRINOLOGY | Facility: CLINIC | Age: 65
End: 2025-04-15
Payer: COMMERCIAL

## 2025-04-15 DIAGNOSIS — E11.9 TYPE 2 DIABETES MELLITUS WITHOUT COMPLICATION, WITHOUT LONG-TERM CURRENT USE OF INSULIN (H): ICD-10-CM

## 2025-04-15 RX ORDER — TIRZEPATIDE 15 MG/.5ML
15 INJECTION, SOLUTION SUBCUTANEOUS
Qty: 2 ML | Refills: 1 | Status: SHIPPED | OUTPATIENT
Start: 2025-04-15

## 2025-05-28 SDOH — HEALTH STABILITY: PHYSICAL HEALTH: ON AVERAGE, HOW MANY DAYS PER WEEK DO YOU ENGAGE IN MODERATE TO STRENUOUS EXERCISE (LIKE A BRISK WALK)?: 3 DAYS

## 2025-05-28 SDOH — HEALTH STABILITY: PHYSICAL HEALTH: ON AVERAGE, HOW MANY MINUTES DO YOU ENGAGE IN EXERCISE AT THIS LEVEL?: 30 MIN

## 2025-05-28 ASSESSMENT — SOCIAL DETERMINANTS OF HEALTH (SDOH): HOW OFTEN DO YOU GET TOGETHER WITH FRIENDS OR RELATIVES?: TWICE A WEEK

## 2025-06-02 ENCOUNTER — OFFICE VISIT (OUTPATIENT)
Dept: PEDIATRICS | Facility: CLINIC | Age: 65
End: 2025-06-02
Payer: COMMERCIAL

## 2025-06-02 VITALS
HEART RATE: 88 BPM | BODY MASS INDEX: 39.19 KG/M2 | HEIGHT: 63 IN | OXYGEN SATURATION: 97 % | WEIGHT: 221.2 LBS | RESPIRATION RATE: 16 BRPM | SYSTOLIC BLOOD PRESSURE: 128 MMHG | DIASTOLIC BLOOD PRESSURE: 78 MMHG | TEMPERATURE: 98.9 F

## 2025-06-02 DIAGNOSIS — E11.65 TYPE 2 DIABETES MELLITUS WITH HYPERGLYCEMIA, WITHOUT LONG-TERM CURRENT USE OF INSULIN (H): ICD-10-CM

## 2025-06-02 DIAGNOSIS — Z00.00 ENCOUNTER FOR PREVENTATIVE ADULT HEALTH CARE EXAMINATION: Primary | ICD-10-CM

## 2025-06-02 DIAGNOSIS — Z23 ENCOUNTER FOR ADMINISTRATION OF VACCINE: ICD-10-CM

## 2025-06-02 DIAGNOSIS — E55.9 VITAMIN D DEFICIENCY: ICD-10-CM

## 2025-06-02 DIAGNOSIS — Z78.0 ASYMPTOMATIC POSTMENOPAUSAL ESTROGEN DEFICIENCY: ICD-10-CM

## 2025-06-02 DIAGNOSIS — E66.812 CLASS 2 SEVERE OBESITY WITH SERIOUS COMORBIDITY AND BODY MASS INDEX (BMI) OF 38.0 TO 38.9 IN ADULT, UNSPECIFIED OBESITY TYPE (H): ICD-10-CM

## 2025-06-02 DIAGNOSIS — E66.01 CLASS 2 SEVERE OBESITY WITH SERIOUS COMORBIDITY AND BODY MASS INDEX (BMI) OF 38.0 TO 38.9 IN ADULT, UNSPECIFIED OBESITY TYPE (H): ICD-10-CM

## 2025-06-02 PROCEDURE — 99397 PER PM REEVAL EST PAT 65+ YR: CPT | Performed by: NURSE PRACTITIONER

## 2025-06-02 PROCEDURE — 3078F DIAST BP <80 MM HG: CPT | Performed by: NURSE PRACTITIONER

## 2025-06-02 PROCEDURE — G2211 COMPLEX E/M VISIT ADD ON: HCPCS | Performed by: NURSE PRACTITIONER

## 2025-06-02 PROCEDURE — 1126F AMNT PAIN NOTED NONE PRSNT: CPT | Performed by: NURSE PRACTITIONER

## 2025-06-02 PROCEDURE — 99214 OFFICE O/P EST MOD 30 MIN: CPT | Mod: 25 | Performed by: NURSE PRACTITIONER

## 2025-06-02 PROCEDURE — 3074F SYST BP LT 130 MM HG: CPT | Performed by: NURSE PRACTITIONER

## 2025-06-02 ASSESSMENT — PAIN SCALES - GENERAL: PAINLEVEL_OUTOF10: NO PAIN (0)

## 2025-06-02 NOTE — PROGRESS NOTES
"Preventive Care Visit  [facility] MICHELLE Reynolds CNP, Family Medicine  Jun 2, 2025      Assessment & Plan     Encounter for preventative adult health care examination  Age appropriate screening and preventative care have been addressed today. Vaccinations have been reviewed and discussed. She will return for the MMR vaccine at a later date. Recommend annual vision exams as well as biannual dental exams. They will follow up for annual physical again in one year.   - pap is up to date  - mammo is up to date  - colonoscopy is up to date  - DEXA is due, ordered    Class 2 severe obesity with serious comorbidity and body mass index (BMI) of 38.0 to 38.9 in adult, unspecified obesity type (H)  Type 2 diabetes mellitus with hyperglycemia, without long-term current use of insulin (H)  Chronic, stable. Not fasting today so will return for labs. Current medications include mounjaro 15 mg weekly and metformin 500 mg daily. She does follow with endocrinology and has an upcoming visit scheduled 7/1/25.  - HEMOGLOBIN A1C; Future  - Lipid panel reflex to direct LDL Fasting; Future  - Albumin Random Urine Quantitative with Creat Ratio; Future  - Comprehensive metabolic panel (BMP + Alb, Alk Phos, ALT, AST, Total. Bili, TP); Future    Vitamin D deficiency  Asymptomatic postmenopausal estrogen deficiency  - DEXA HIP/PELVIS/SPINE - Future; Future    Encounter for administration of vaccine  Reviewed indications for vaccination with MMR. She isn't sure of the details of her MMR vaccine history. Believes she received it in childhood but can't recall if it was inactivated virus or live virus. She is agreeable to re-vaccinating.   - MMR (M-M-R II); Future            BMI  Estimated body mass index is 38.71 kg/m  as calculated from the following:    Height as of this encounter: 1.61 m (5' 3.39\").    Weight as of this encounter: 100.3 kg (221 lb 3.2 oz).   Weight management plan: Discussed healthy diet and exercise " guidelines    Counseling  Appropriate preventive services were addressed with this patient via screening, questionnaire, or discussion as appropriate for fall prevention, nutrition, physical activity, Tobacco-use cessation, social engagement, weight loss and cognition.  Checklist reviewing preventive services available has been given to the patient.  Reviewed patient's diet, addressing concerns and/or questions.   She is at risk for lack of exercise and has been provided with information to increase physical activity for the benefit of her well-being.         Subjective   Parish is a 64 year old, presenting for the following:  Physical        6/2/2025     2:30 PM   Additional Questions   Roomed by Bess   Accompanied by self         6/2/2025     2:30 PM   Patient Reported Additional Medications   Patient reports taking the following new medications no          HPI    Advance Care Planning    Discussed advance care planning with patient; informed AVS has link to [employer].        5/28/2025   General Health   How would you rate your overall physical health? Good   Feel stress (tense, anxious, or unable to sleep) Not at all         5/28/2025   Nutrition   Diet: Low salt         5/28/2025   Exercise   Days per week of moderate/strenous exercise 3 days   Average minutes spent exercising at this level 30 min         5/28/2025   Social Factors   Frequency of gathering with friends or relatives Twice a week   Worry food won't last until get money to buy more No   Food not last or not have enough money for food? No   Do you have housing? (Housing is defined as stable permanent housing and does not include staying outside in a car, in a tent, in an abandoned building, in an overnight shelter, or couch-surfing.) Yes   Are you worried about losing your housing? No   Lack of transportation? No   Unable to get utilities (heat,electricity)? No         5/28/2025   Fall Risk   Fallen 2 or more times in the past year? No    Trouble with walking or balance? No          5/28/2025   Activities of Daily Living- Home Safety   Needs help with the following daily activites None of the above   Safety concerns in the home None of the above         5/28/2025   Dental   Dentist two times every year? Yes         5/28/2025   Hearing Screening   Hearing concerns? None of the above         5/28/2025   Driving Risk Screening   Patient/family members have concerns about driving No         5/28/2025   General Alertness/Fatigue Screening   Have you been more tired than usual lately? No         5/28/2025   Urinary Incontinence Screening   Bothered by leaking urine in past 6 months No         Today's PHQ-2 Score:       6/2/2025     9:19 AM   PHQ-2 ( 1999 Pf[employer])   Q1: Little interest or pleasure in doing things 0   Q2: Feeling down, depressed or hopeless 0   PHQ-2 Score 0    Q1: Little interest or pleasure in doing things Not at all   Q2: Feeling down, depressed or hopeless Not at all   PHQ-2 Score 0       Patient-reported           5/28/2025   Substance Use   Alcohol more than 3/day or more than 7/wk Not Applicable   Do you have a current opioid prescription? No   How severe/bad is pain from 1 to 10? 0/10 (No Pain)   Do you use any other substances recreationally? No     Social History     Tobacco Use    Smoking status: Never     Passive exposure: Never    Smokeless tobacco: Never   Vaping Use    Vaping status: Never Used   Substance Use Topics    Alcohol use: No    Drug use: No           10/2/2024   LAST FHS-7 RESULTS   1st degree relative breast or ovarian cancer Yes   Any relative bilateral breast cancer No   Any male have breast cancer No   Any ONE woman have BOTH breast AND ovarian cancer No   Any woman with breast cancer before 50yrs Yes   2 or more relatives with breast AND/OR ovarian cancer Yes   2 or more relatives with breast AND/OR bowel cancer Yes        Mammogram Screening - Mammogram every 1-2 years updated in Health Maintenance based on  mutual decision making      History of abnormal Pap smear: No - age 30- 64 PAP with HPV every 5 years recommended        Latest Ref Rng & Units 3/19/2021     9:08 AM 3/19/2021     8:05 AM   PAP / HPV   PAP (Historical)  NIL     HPV 16 DNA NEG^Negative  Negative    HPV 18 DNA NEG^Negative  Negative    Other HR HPV NEG^Negative  Negative      ASCVD Risk   The 10-year ASCVD risk score (Hector MERLOS, et al., 2019) is: 11.9%    Values used to calculate the score:      Age: 64 years      Sex: Female      Is Non- : No      Diabetic: Yes      Tobacco smoker: No      Systolic Blood Pressure: 128 mmHg      Is BP treated: No      HDL Cholesterol: 53 mg/dL      Total Cholesterol: 205 mg/dL            Reviewed and updated as needed this visit by Provider                    Patient Active Problem List   Diagnosis    Class 3 severe obesity due to excess calories with serious comorbidity and body mass index (BMI) of 40.0 to 44.9 in adult (H)    Varicose veins of lower extremities with inflammation    CARDIOVASCULAR SCREENING; LDL GOAL LESS THAN 160    Polycystic ovarian syndrome    Thyroid nodule    Dermatitis    JORGE (obstructive sleep apnea)    ACP (advance care planning)    Diabetes mellitus, type 2 (H)     Past Surgical History:   Procedure Laterality Date    ABDOMEN SURGERY      Tummy tuck    BREAST SURGERY      reduction    GI SURGERY      liposuction and removal of Pannus    GYN SURGERY      breast reduction    HC OPEN TX METATARSAL FRACTURE  12/2005    HC REMOVE TONSILS/ADENOIDS,<11 Y/O      ZZC APPENDECTOMY         Social History     Tobacco Use    Smoking status: Never     Passive exposure: Never    Smokeless tobacco: Never   Substance Use Topics    Alcohol use: No     Family History   Problem Relation Age of Onset    Hypertension Mother     Lipids Mother     Diabetes Mother     Cerebrovascular Disease Mother         2 years ago, in 2012    Thyroid Disease Mother     Cancer - colorectal Father  "    Breast Cancer Sister 32        did genetic testing    Cowden Syndrome Sister     Thyroid Disease Brother     Thyroid Cancer Brother     Skin Cancer Brother     Prostate Cancer Brother     Cowden Syndrome Brother     Breast Cancer Maternal Aunt     Diabetes Other     Diabetes Other     Breast Cancer Maternal Cousin          Current providers sharing in care for this patient include:  Patient Care Team:  Bianca Arias MD as PCP - General (Internal Medicine)  Bianca Arias MD as Assigned PCP  Susannah Park PA-C as Physician Assistant (Endocrinology, Diabetes, and Metabolism)  Norma Feliciano RN as Diabetes Educator (Diabetes Education)  Susannah Park PA-C as Assigned Endocrinology Provider    The following health maintenance items are reviewed in Epic and correct as of today:  Health Maintenance   Topic Date Due    DEXA  Never done    DIABETIC FOOT EXAM  10/03/2024    MEDICARE ANNUAL WELLNESS VISIT  03/08/2025    ANNUAL REVIEW OF HM ORDERS  03/08/2025    COVID-19 VACCINE (11 - 2024-25 season) 04/05/2025    A1C  06/16/2025    BMP  06/16/2025    LIPID  06/25/2025    MICROALBUMIN  06/25/2025    MAMMO SCREENING  10/02/2025    EYE EXAM  04/09/2026    FALL RISK ASSESSMENT  06/02/2026    ADVANCE CARE PLANNING  03/08/2029    COLORECTAL CANCER SCREENING  07/11/2029    DTAP/TDAP/TD VACCINE (3 - Td or Tdap) 09/16/2030    HEPATITIS C SCREENING  Completed    PHQ-2 (once per calendar year)  Completed    PAP FOLLOW-UP  Completed    HPV FOLLOW-UP  Completed    INFLUENZA VACCINE  Completed    PNEUMOCOCCAL VACCINE 50+ YEARS  Completed    ZOSTER VACCINE  Completed    RSV VACCINE  Completed    HPV VACCINE  Aged Out    MENINGITIS VACCINE  Aged Out    PAP  Discontinued          Objective    Exam  /78 (BP Location: Right arm, Patient Position: Sitting, Cuff Size: Adult Large)   Pulse 88   Temp 98.9  F (37.2  C) (Tympanic)   Resp 16   Ht 1.61 m (5' 3.39\")   Wt 100.3 kg (221 lb 3.2 oz)   LMP 01/25/2009   SpO2 97%   BMI " "38.71 kg/m     Estimated body mass index is 38.71 kg/m  as calculated from the following:    Height as of this encounter: 1.61 m (5' 3.39\").    Weight as of this encounter: 100.3 kg (221 lb 3.2 oz).    Physical Exam  Constitutional: appears to be in no acute distress, comfortable, pleasant.   Eyes: anicteric, conjunctiva clear without drainage or erythema. YARIEL.   Ears, Nose and Throat: tympanic membranes gray with LR,  nose without nasal discharge. OP: no erythema to posterior pharynx, negative post nasal drainage, tonsils +1 no erythema or exudate.  Neck: supple, thyroid palpable,not enlarged, no nodules   Breast: Exam deferred (deferred after discussion of exam options with patient, no symptoms or concerns).   Cardiovascular: regular rate and rhythm, normal S1 and S2, no murmurs, rubs or gallops, peripheral pulses full and symmetric; negative peripheral edema   Respiratory: Air entry throughout. Breathing pattern unlabored without the use of accessory muscles. Clear to auscultation A and P, no wheezes or crackles, normal breath sounds.    Gastrointestinal: rounded, soft. Positive bowel sounds x4, nontender, no masses.   Genitourinary: Exam deferred (deferred after discussion of exam options with patient, no symptoms or concerns, pap is up to date).   Musculoskeletal: full range of motion, no edema.   Skin: pink, turgor smooth and elastic. Negative for lesions or dryness.  Neurological: normal gait, no tremor.   Psychological: appropriate mood and affect.   Lymphatic: no cervical, axillary, supraclavicular, or infraclavicular lymphadenopathy.          6/2/2025   Mini Cog   Clock Draw Score 2 Normal   3 Item Recall 3 objects recalled   Mini Cog Total Score 5             Signed Electronically by: MICHELLE Leyva CNP    "

## 2025-06-10 ENCOUNTER — MYC REFILL (OUTPATIENT)
Dept: ENDOCRINOLOGY | Facility: CLINIC | Age: 65
End: 2025-06-10
Payer: COMMERCIAL

## 2025-06-10 DIAGNOSIS — E11.9 TYPE 2 DIABETES MELLITUS WITHOUT COMPLICATION, WITHOUT LONG-TERM CURRENT USE OF INSULIN (H): ICD-10-CM

## 2025-06-10 RX ORDER — TIRZEPATIDE 15 MG/.5ML
15 INJECTION, SOLUTION SUBCUTANEOUS
Qty: 2 ML | Refills: 1 | Status: SHIPPED | OUTPATIENT
Start: 2025-06-10

## 2025-07-01 ENCOUNTER — OFFICE VISIT (OUTPATIENT)
Dept: ENDOCRINOLOGY | Facility: CLINIC | Age: 65
End: 2025-07-01
Payer: COMMERCIAL

## 2025-07-01 VITALS
RESPIRATION RATE: 16 BRPM | OXYGEN SATURATION: 99 % | BODY MASS INDEX: 37.05 KG/M2 | HEIGHT: 63 IN | TEMPERATURE: 97.6 F | SYSTOLIC BLOOD PRESSURE: 144 MMHG | HEART RATE: 82 BPM | DIASTOLIC BLOOD PRESSURE: 94 MMHG | WEIGHT: 209.1 LBS

## 2025-07-01 DIAGNOSIS — E11.9 TYPE 2 DIABETES MELLITUS WITHOUT COMPLICATION, WITHOUT LONG-TERM CURRENT USE OF INSULIN (H): ICD-10-CM

## 2025-07-01 PROCEDURE — 3077F SYST BP >= 140 MM HG: CPT | Performed by: PHYSICIAN ASSISTANT

## 2025-07-01 PROCEDURE — 3079F DIAST BP 80-89 MM HG: CPT | Performed by: PHYSICIAN ASSISTANT

## 2025-07-01 PROCEDURE — 99207 PR FOOT EXAM NO CHARGE: CPT | Performed by: PHYSICIAN ASSISTANT

## 2025-07-01 PROCEDURE — 99214 OFFICE O/P EST MOD 30 MIN: CPT | Performed by: PHYSICIAN ASSISTANT

## 2025-07-01 RX ORDER — FLUOCINONIDE 0.5 MG/G
OINTMENT TOPICAL
COMMUNITY
Start: 2025-04-14

## 2025-07-01 RX ORDER — TIRZEPATIDE 15 MG/.5ML
15 INJECTION, SOLUTION SUBCUTANEOUS
Qty: 2 ML | Refills: 1 | Status: SHIPPED | OUTPATIENT
Start: 2025-07-01

## 2025-07-01 RX ORDER — TRETINOIN 0.25 MG/G
CREAM TOPICAL
COMMUNITY
Start: 2025-06-22

## 2025-07-01 RX ORDER — METFORMIN HYDROCHLORIDE 500 MG/1
500 TABLET, EXTENDED RELEASE ORAL
Qty: 90 TABLET | Refills: 1 | Status: SHIPPED | OUTPATIENT
Start: 2025-07-01

## 2025-07-01 NOTE — PROGRESS NOTES
Assessment/Plan :   Type 2 DM. Parish is doing great. Her blood sugars are very stable. She has also continued to slowly lose weight with the Mounjaro. She would like to get under 200 lbs and she is very close. She has had some indigestion with the Mounjaro but she feels like it is manageable. She is due for routine laboratory testing and she is scheduled for a lab draw on July 28th. I will place orders today. I encouraged her to keep up the good work and we will follow-up in 1 yr.       I have independently reviewed and interpreted labs, imaging as indicated.      Chief complaint:  Vani is a 64 year old female who returns for follow-up of type 2 diabetes.     I have reviewed Care Everywhere including [facility], [facility], [facility], [facility], [facility], [facility] , [facility], [place] lab reports, imaging reports and provider notes as indicated.      HISTORY OF PRESENT ILLNESS  Parish is doing great. She continues to take Mounjaro 15 mg weekly along with metformin  mg daily. She has lost almost 40 lbs over the last year. Her goal is to get under 200 lbs and she is currently 9 lbs away. She has had some indigestion after administering the injection. She has also noticed that her heart rate is often up over night after the injection. She is no longer experiencing any skin irritation at the injection site.     Parish monitors her blood sugars with fingerstick testing. Her blood sugars are very stable. She has not had any issues with severe hyperglycemia and/or hypoglycemia. She also has not noticed any changes in her vision or worsening numbness/tingling in her feet. She does have a history of a retinal bleed but her ophthalmologist couldn't find any evidence of the bleed at her recent eye exam. Lastly, she does have some lower extremity swelling related to varicose veins.     Parish was diagnosed with pre-diabetes about 6 yrs ago. She was started on metformin at that time and she worked hard  on making healthy lifestyle changes. She was able to discontinue the metformin around 2018 due to improvement in her blood sugars. Unfortunately, in 2019, she started to regain the weight that she lost and her hemoglobin A1C creeped up. She was restarted on metformin in 2022. This seemed to work well, until recently. She started to develop more GI side effects with the metformin and her weight loss slowed. We started her on Mounjaro in October of 2023 and have increased the dose monthly. Her diabetes is complicated by obesity, JORGE, and hyperlipidemia.     Endocrine relevant labs are as follows:   Latest Reference Range & Units 12/16/24 07:37   Hemoglobin A1C 0.0 - 5.6 % 5.5      Latest Reference Range & Units 06/25/24 07:47   Hemoglobin A1C 0.0 - 5.6 % 6.0 (H)   (H): Data is abnormally high   Latest Reference Range & Units 06/25/24 07:47   Albumin Urine mg/L mg/L <12.0     REVIEW OF SYSTEMS    10 system ROS otherwise as per the HPI or negative    Past Medical History  Past Medical History:   Diagnosis Date    Chest pain, unspecified 06/2006    neg stress test in [facility]    Diabetes mellitus, type 2 (H) 06/2019    Gestational diabetes mellitus, delivered 04/25/2008    Obesity, unspecified     Thyroid nodule 2018       Medications  Current Outpatient Medications   Medication Sig Dispense Refill    aspirin 81 MG EC tablet Take 81 mg by mouth daily      blood glucose (ONETOUCH ULTRA) test strip Use to test blood sugar 3 times daily or as directed. 300 strip 0    blood glucose monitoring (ONETOUCH VERIO) meter device kit Use to test blood sugar 3 times daily or as directed. 1 kit 0    Cholecalciferol (VITAMIN D-3 PO) Take 2,000 Units by mouth daily      ciclopirox (PENLAC) 8 % external solution 1 APPLICATION DAILY TOPICALLY APPLY TO NAIL DAILY.      Continuous Blood Gluc Sensor (FREESTYLE THONY 2 SENSOR) MISC 1 each every 14 days 2 each 11    fluocinonide (LIDEX) 0.05 % external gel       Lancets (ONETOUCH DELICA PLUS  "[id number]) MISC USE ONCE DAILY AS DIRECTED. 100 each 2    metFORMIN (GLUCOPHAGE XR) 500 MG 24 hr tablet Take 1 tablet (500 mg) by mouth daily (with dinner). 90 tablet 1    multivitamin (CENTRUM SILVER) tablet Take 1 tablet by mouth daily      tirzepatide (MOUNJARO) 15 MG/0.5ML SOAJ auto-injector pen Inject 0.5 mLs (15 mg) subcutaneously every 7 days. 2 mL 1    triamcinolone (KENALOG) 0.1 % external cream Apply topically 2 times daily 30 g 3       Allergies  Allergies   Allergen Reactions    Sulfa Antibiotics Anaphylaxis    Dust Mites          Family History  family history includes Breast Cancer in her maternal aunt and maternal cousin; Breast Cancer (age of onset: 32) in her sister; Cancer - colorectal in her father; Cerebrovascular Disease in her mother; Cowden Syndrome in her brother and sister; Diabetes in her mother and other family members; Hypertension in her mother; Lipids in her mother; Prostate Cancer in her brother; Skin Cancer in her brother; Thyroid Cancer in her brother; Thyroid Disease in her brother and mother.    Social History  Social History     Tobacco Use    Smoking status: Never     Passive exposure: Never    Smokeless tobacco: Never   Vaping Use    Vaping status: Never Used   Substance Use Topics    Alcohol use: No    Drug use: No       Physical Exam  BP (!) 146/83 (BP Location: Left arm, Patient Position: Chair, Cuff Size: Adult Large)   Pulse 82   Temp 97.6  F (36.4  C) (Tympanic)   Resp 16   Ht 1.61 m (5' 3.39\")   Wt 94.8 kg (209 lb 1.6 oz)   LMP 01/25/2009   SpO2 99%   Breastfeeding No   BMI 36.59 kg/m    Body mass index is 36.59 kg/m .  GENERAL :  In no apparent distress  SKIN: Normal color, normal temperature, texture.  No hirsutism, alopecia or purple striae.     EYES: PERRL, EOMI, No scleral icterus,  No proptosis, conjunctival redness, stare, retraction  RESP: Lungs clear to auscultation bilaterally  CARDIAC: Regular rate and rhythm, normal S1 S2, without murmurs, rubs or " gallops    NEURO: awake, alert, responds appropriately to questions.  Cranial nerves intact.   Moves all extremities; Gait normal.  No tremor of the outstretched hand.    EXTREMITIES: No clubbing, cyanosis or edema. Significant for varicose veins bilateral    DATA REVIEW  Glooko Report  Time in target range 100%  Current Ave  mg/dl

## 2025-07-01 NOTE — LETTER
7/1/2025      Vani Daniel  [address] [facility] [id number]      Dear Colleague,    Thank you for referring your patient, Vani Daniel, to the [facility]. Please see a copy of my visit note below.    Assessment/Plan :   Type 2 DM. Parish is doing great. Her blood sugars are very stable. She has also continued to slowly lose weight with the Mounjaro. She would like to get under 200 lbs and she is very close. She has had some indigestion with the Mounjaro but she feels like it is manageable. She is due for routine laboratory testing and she is scheduled for a lab draw on July 28th. I will place orders today. I encouraged her to keep up the good work and we will follow-up in 1 yr.       I have independently reviewed and interpreted labs, imaging as indicated.      Chief complaint:  Vani is a 64 year old female who returns for follow-up of type 2 diabetes.     I have reviewed Care Everywhere including [facility], [facility], [place], [facility], [facility], [facility] , [facility], [place] lab reports, imaging reports and provider notes as indicated.      HISTORY OF PRESENT ILLNESS  Parish is doing great. She continues to take Mounjaro 15 mg weekly along with metformin  mg daily. She has lost almost 40 lbs over the last year. Her goal is to get under 200 lbs and she is currently 9 lbs away. She has had some indigestion after administering the injection. She has also noticed that her heart rate is often up over night after the injection. She is no longer experiencing any skin irritation at the injection site.     Parish monitors her blood sugars with fingerstick testing. Her blood sugars are very stable. She has not had any issues with severe hyperglycemia and/or hypoglycemia. She also has not noticed any changes in her vision or worsening numbness/tingling in her feet. She does have a history of a retinal bleed but her ophthalmologist couldn't  find any evidence of the bleed at her recent eye exam. Lastly, she does have some lower extremity swelling related to varicose veins.     Parish was diagnosed with pre-diabetes about 6 yrs ago. She was started on metformin at that time and she worked hard on making healthy lifestyle changes. She was able to discontinue the metformin around 2018 due to improvement in her blood sugars. Unfortunately, in 2019, she started to regain the weight that she lost and her hemoglobin A1C creeped up. She was restarted on metformin in 2022. This seemed to work well, until recently. She started to develop more GI side effects with the metformin and her weight loss slowed. We started her on Mounjaro in October of 2023 and have increased the dose monthly. Her diabetes is complicated by obesity, JORGE, and hyperlipidemia.     Endocrine relevant labs are as follows:   Latest Reference Range & Units 12/16/24 07:37   Hemoglobin A1C 0.0 - 5.6 % 5.5      Latest Reference Range & Units 06/25/24 07:47   Hemoglobin A1C 0.0 - 5.6 % 6.0 (H)   (H): Data is abnormally high   Latest Reference Range & Units 06/25/24 07:47   Albumin Urine mg/L mg/L <12.0     REVIEW OF SYSTEMS    10 system ROS otherwise as per the HPI or negative    Past Medical History  Past Medical History:   Diagnosis Date     Chest pain, unspecified 06/2006    neg stress test in [facility]     Diabetes mellitus, type 2 (H) 06/2019     Gestational diabetes mellitus, delivered 04/25/2008     Obesity, unspecified      Thyroid nodule 2018       Medications  Current Outpatient Medications   Medication Sig Dispense Refill     aspirin 81 MG EC tablet Take 81 mg by mouth daily       blood glucose (ONETOUCH ULTRA) test strip Use to test blood sugar 3 times daily or as directed. 300 strip 0     blood glucose monitoring (ONETOUCH VERIO) meter device kit Use to test blood sugar 3 times daily or as directed. 1 kit 0     Cholecalciferol (VITAMIN D-3 PO) Take 2,000 Units by mouth daily       ciclopirox  "(PENLAC) 8 % external solution 1 APPLICATION DAILY TOPICALLY APPLY TO NAIL DAILY.       Continuous Blood Gluc Sensor (FREESTYLE THONY 2 SENSOR) MISC 1 each every 14 days 2 each 11     fluocinonide (LIDEX) 0.05 % external gel        Lancets (ONETOUCH DELICA PLUS [id number]) MISC USE ONCE DAILY AS DIRECTED. 100 each 2     metFORMIN (GLUCOPHAGE XR) 500 MG 24 hr tablet Take 1 tablet (500 mg) by mouth daily (with dinner). 90 tablet 1     multivitamin (CENTRUM SILVER) tablet Take 1 tablet by mouth daily       tirzepatide (MOUNJARO) 15 MG/0.5ML SOAJ auto-injector pen Inject 0.5 mLs (15 mg) subcutaneously every 7 days. 2 mL 1     triamcinolone (KENALOG) 0.1 % external cream Apply topically 2 times daily 30 g 3       Allergies  Allergies   Allergen Reactions     Sulfa Antibiotics Anaphylaxis     Dust Mites          Family History  family history includes Breast Cancer in her maternal aunt and maternal cousin; Breast Cancer (age of onset: 32) in her sister; Cancer - colorectal in her father; Cerebrovascular Disease in her mother; Cowden Syndrome in her brother and sister; Diabetes in her mother and other family members; Hypertension in her mother; Lipids in her mother; Prostate Cancer in her brother; Skin Cancer in her brother; Thyroid Cancer in her brother; Thyroid Disease in her brother and mother.    Social History  Social History     Tobacco Use     Smoking status: Never     Passive exposure: Never     Smokeless tobacco: Never   Vaping Use     Vaping status: Never Used   Substance Use Topics     Alcohol use: No     Drug use: No       Physical Exam  BP (!) 146/83 (BP Location: Left arm, Patient Position: Chair, Cuff Size: Adult Large)   Pulse 82   Temp 97.6  F (36.4  C) (Tympanic)   Resp 16   Ht 1.61 m (5' 3.39\")   Wt 94.8 kg (209 lb 1.6 oz)   LMP 01/25/2009   SpO2 99%   Breastfeeding No   BMI 36.59 kg/m    Body mass index is 36.59 kg/m .  GENERAL :  In no apparent distress  SKIN: Normal color, normal temperature, " texture.  No hirsutism, alopecia or purple striae.     EYES: PERRL, EOMI, No scleral icterus,  No proptosis, conjunctival redness, stare, retraction  RESP: Lungs clear to auscultation bilaterally  CARDIAC: Regular rate and rhythm, normal S1 S2, without murmurs, rubs or gallops    NEURO: awake, alert, responds appropriately to questions.  Cranial nerves intact.   Moves all extremities; Gait normal.  No tremor of the outstretched hand.    EXTREMITIES: No clubbing, cyanosis or edema. Significant for varicose veins bilateral    DATA REVIEW  Glooko Report  Time in target range 100%  Current Ave  mg/dl         Again, thank you for allowing me to participate in the care of your patient.        Sincerely,        Susannah Park PA-C    Electronically signed

## 2025-07-05 ENCOUNTER — HEALTH MAINTENANCE LETTER (OUTPATIENT)
Age: 65
End: 2025-07-05

## 2025-07-28 ENCOUNTER — ANCILLARY PROCEDURE (OUTPATIENT)
Dept: BONE DENSITY | Facility: CLINIC | Age: 65
End: 2025-07-28
Attending: NURSE PRACTITIONER
Payer: COMMERCIAL

## 2025-07-28 ENCOUNTER — ALLIED HEALTH/NURSE VISIT (OUTPATIENT)
Dept: PEDIATRICS | Facility: CLINIC | Age: 65
End: 2025-07-28
Payer: COMMERCIAL

## 2025-07-28 ENCOUNTER — TELEPHONE (OUTPATIENT)
Dept: PEDIATRICS | Facility: CLINIC | Age: 65
End: 2025-07-28

## 2025-07-28 ENCOUNTER — LAB (OUTPATIENT)
Dept: LAB | Facility: CLINIC | Age: 65
End: 2025-07-28
Payer: COMMERCIAL

## 2025-07-28 VITALS — TEMPERATURE: 97.3 F

## 2025-07-28 DIAGNOSIS — Z23 ENCOUNTER FOR ADMINISTRATION OF VACCINE: ICD-10-CM

## 2025-07-28 DIAGNOSIS — E11.9 TYPE 2 DIABETES MELLITUS WITHOUT COMPLICATION, WITHOUT LONG-TERM CURRENT USE OF INSULIN (H): ICD-10-CM

## 2025-07-28 DIAGNOSIS — E11.65 TYPE 2 DIABETES MELLITUS WITH HYPERGLYCEMIA, WITHOUT LONG-TERM CURRENT USE OF INSULIN (H): ICD-10-CM

## 2025-07-28 DIAGNOSIS — E55.9 VITAMIN D DEFICIENCY: ICD-10-CM

## 2025-07-28 DIAGNOSIS — Z78.0 ASYMPTOMATIC POSTMENOPAUSAL ESTROGEN DEFICIENCY: ICD-10-CM

## 2025-07-28 LAB
ALBUMIN SERPL BCG-MCNC: 4.1 G/DL (ref 3.5–5.2)
ALP SERPL-CCNC: 81 U/L (ref 40–150)
ALT SERPL W P-5'-P-CCNC: 31 U/L (ref 0–50)
ANION GAP SERPL CALCULATED.3IONS-SCNC: 9 MMOL/L (ref 7–15)
AST SERPL W P-5'-P-CCNC: 30 U/L (ref 0–45)
BILIRUB SERPL-MCNC: 0.6 MG/DL
BUN SERPL-MCNC: 16.5 MG/DL (ref 8–23)
CALCIUM SERPL-MCNC: 9.6 MG/DL (ref 8.8–10.4)
CHLORIDE SERPL-SCNC: 104 MMOL/L (ref 98–107)
CHOLEST SERPL-MCNC: 205 MG/DL
CREAT SERPL-MCNC: 0.92 MG/DL (ref 0.51–0.95)
CREAT UR-MCNC: 113 MG/DL
EGFRCR SERPLBLD CKD-EPI 2021: 68 ML/MIN/1.73M2
EST. AVERAGE GLUCOSE BLD GHB EST-MCNC: 108 MG/DL
FASTING STATUS PATIENT QL REPORTED: YES
FASTING STATUS PATIENT QL REPORTED: YES
GLUCOSE SERPL-MCNC: 93 MG/DL (ref 70–99)
HBA1C MFR BLD: 5.4 % (ref 0–5.6)
HCO3 SERPL-SCNC: 27 MMOL/L (ref 22–29)
HDLC SERPL-MCNC: 50 MG/DL
LDLC SERPL CALC-MCNC: 134 MG/DL
MICROALBUMIN UR-MCNC: <12 MG/L
MICROALBUMIN/CREAT UR: NORMAL MG/G{CREAT}
NONHDLC SERPL-MCNC: 155 MG/DL
POTASSIUM SERPL-SCNC: 4.5 MMOL/L (ref 3.4–5.3)
PROT SERPL-MCNC: 7.3 G/DL (ref 6.4–8.3)
SODIUM SERPL-SCNC: 140 MMOL/L (ref 135–145)
TRIGL SERPL-MCNC: 104 MG/DL
TSH SERPL DL<=0.005 MIU/L-ACNC: 2.02 UIU/ML (ref 0.3–4.2)

## 2025-07-28 PROCEDURE — 80053 COMPREHEN METABOLIC PANEL: CPT

## 2025-07-28 PROCEDURE — 99207 PR NO CHARGE NURSE ONLY: CPT

## 2025-07-28 PROCEDURE — 90471 IMMUNIZATION ADMIN: CPT

## 2025-07-28 PROCEDURE — 83036 HEMOGLOBIN GLYCOSYLATED A1C: CPT

## 2025-07-28 PROCEDURE — 82570 ASSAY OF URINE CREATININE: CPT

## 2025-07-28 PROCEDURE — 77080 DXA BONE DENSITY AXIAL: CPT | Mod: TC | Performed by: RADIOLOGY

## 2025-07-28 PROCEDURE — 90707 MMR VACCINE SC: CPT

## 2025-07-28 PROCEDURE — 84443 ASSAY THYROID STIM HORMONE: CPT

## 2025-07-28 PROCEDURE — 80061 LIPID PANEL: CPT

## 2025-07-28 PROCEDURE — 36415 COLL VENOUS BLD VENIPUNCTURE: CPT

## 2025-07-28 PROCEDURE — 82043 UR ALBUMIN QUANTITATIVE: CPT

## 2025-07-28 NOTE — PROGRESS NOTES
MMR ordered by provider. PEQ Vaccine Questionnaire completed and ok to proceed with vaccine. Temp normal: 97.3      Ashanti MINAYA CMA

## 2025-07-28 NOTE — TELEPHONE ENCOUNTER
Pt was in clinic today for an MMR vaccine. She was wondering if she would need a second. Did not see an order for a two dose series. Please advise      Ashanti MINAYA CMA

## 2025-07-29 ENCOUNTER — RESULTS FOLLOW-UP (OUTPATIENT)
Dept: ENDOCRINOLOGY | Facility: CLINIC | Age: 65
End: 2025-07-29
Payer: COMMERCIAL

## 2025-08-29 ENCOUNTER — MYC REFILL (OUTPATIENT)
Dept: ENDOCRINOLOGY | Facility: CLINIC | Age: 65
End: 2025-08-29
Payer: COMMERCIAL

## 2025-08-29 DIAGNOSIS — E11.9 TYPE 2 DIABETES MELLITUS WITHOUT COMPLICATION, WITHOUT LONG-TERM CURRENT USE OF INSULIN (H): ICD-10-CM

## 2025-09-03 ENCOUNTER — PATIENT OUTREACH (OUTPATIENT)
Dept: CARE COORDINATION | Facility: CLINIC | Age: 65
End: 2025-09-03
Payer: COMMERCIAL

## 2025-09-03 RX ORDER — TIRZEPATIDE 15 MG/.5ML
15 INJECTION, SOLUTION SUBCUTANEOUS
Qty: 6 ML | Refills: 2 | Status: SHIPPED | OUTPATIENT
Start: 2025-09-03